# Patient Record
Sex: FEMALE | Race: WHITE | NOT HISPANIC OR LATINO | Employment: OTHER | URBAN - METROPOLITAN AREA
[De-identification: names, ages, dates, MRNs, and addresses within clinical notes are randomized per-mention and may not be internally consistent; named-entity substitution may affect disease eponyms.]

---

## 2018-05-05 ENCOUNTER — HOSPITAL ENCOUNTER (EMERGENCY)
Facility: HOSPITAL | Age: 23
Discharge: HOME | End: 2018-05-05
Attending: FAMILY MEDICINE
Payer: COMMERCIAL

## 2018-05-05 VITALS
DIASTOLIC BLOOD PRESSURE: 92 MMHG | HEIGHT: 61 IN | WEIGHT: 170 LBS | SYSTOLIC BLOOD PRESSURE: 123 MMHG | TEMPERATURE: 98 F | HEART RATE: 76 BPM | BODY MASS INDEX: 32.1 KG/M2 | OXYGEN SATURATION: 98 %
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
## 2018-05-05 DIAGNOSIS — Z34.91 NORMAL IUP (INTRAUTERINE PREGNANCY) ON PRENATAL ULTRASOUND, FIRST TRIMESTER: ICD-10-CM

## 2018-05-05 PROCEDURE — 76801 OB US < 14 WKS SINGLE FETUS: CPT | Mod: 26,,, | Performed by: RADIOLOGY

## 2018-05-05 PROCEDURE — 99283 EMERGENCY DEPT VISIT LOW MDM: CPT

## 2018-05-05 PROCEDURE — 76801 OB US < 14 WKS SINGLE FETUS: CPT | Mod: TC

## 2018-05-05 PROCEDURE — 76817 TRANSVAGINAL US OBSTETRIC: CPT | Mod: 26,,, | Performed by: RADIOLOGY

## 2018-05-06 NOTE — DISCHARGE INSTRUCTIONS
Follow up with your OBGYN as needed, return to the ER if you develop any increase in pain, vaginal bleeding or spotting or other decline in condition. Tyelnol as needed for discomfort.
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
## 2020-01-01 ENCOUNTER — HOSPITAL ENCOUNTER (EMERGENCY)
Facility: HOSPITAL | Age: 25
Discharge: HOME | End: 2020-01-01
Attending: EMERGENCY MEDICINE

## 2020-01-01 VITALS
HEIGHT: 61 IN | TEMPERATURE: 99 F | OXYGEN SATURATION: 97 % | RESPIRATION RATE: 25 BRPM | WEIGHT: 175 LBS | HEART RATE: 79 BPM | BODY MASS INDEX: 33.04 KG/M2 | DIASTOLIC BLOOD PRESSURE: 77 MMHG | SYSTOLIC BLOOD PRESSURE: 134 MMHG

## 2020-01-01 DIAGNOSIS — R00.2 PALPITATIONS: ICD-10-CM

## 2020-01-01 DIAGNOSIS — I49.3 PVC (PREMATURE VENTRICULAR CONTRACTION): Primary | ICD-10-CM

## 2020-01-01 LAB
ALBUMIN SERPL BCP-MCNC: 4.1 G/DL (ref 3.5–5.2)
ALP SERPL-CCNC: 69 U/L (ref 55–135)
ALT SERPL W/O P-5'-P-CCNC: 21 U/L (ref 10–44)
ANION GAP SERPL CALC-SCNC: 6 MMOL/L (ref 8–16)
AST SERPL-CCNC: 19 U/L (ref 10–40)
B-HCG UR QL: NEGATIVE
BASOPHILS # BLD AUTO: 0.04 K/UL (ref 0–0.2)
BASOPHILS NFR BLD: 0.4 % (ref 0–1.9)
BILIRUB SERPL-MCNC: 0.4 MG/DL (ref 0.1–1)
BUN SERPL-MCNC: 10 MG/DL (ref 6–20)
CALCIUM SERPL-MCNC: 9.3 MG/DL (ref 8.7–10.5)
CHLORIDE SERPL-SCNC: 107 MMOL/L (ref 95–110)
CO2 SERPL-SCNC: 25 MMOL/L (ref 23–29)
CREAT SERPL-MCNC: 0.6 MG/DL (ref 0.5–1.4)
DIFFERENTIAL METHOD: ABNORMAL
EOSINOPHIL # BLD AUTO: 0.1 K/UL (ref 0–0.5)
EOSINOPHIL NFR BLD: 0.8 % (ref 0–8)
ERYTHROCYTE [DISTWIDTH] IN BLOOD BY AUTOMATED COUNT: 11.6 % (ref 11.5–14.5)
EST. GFR  (AFRICAN AMERICAN): >60 ML/MIN/1.73 M^2
EST. GFR  (NON AFRICAN AMERICAN): >60 ML/MIN/1.73 M^2
GLUCOSE SERPL-MCNC: 104 MG/DL (ref 70–110)
HCT VFR BLD AUTO: 41.7 % (ref 37–48.5)
HGB BLD-MCNC: 14.9 G/DL (ref 12–16)
IMM GRANULOCYTES # BLD AUTO: 0.03 K/UL (ref 0–0.04)
IMM GRANULOCYTES NFR BLD AUTO: 0.3 % (ref 0–0.5)
LYMPHOCYTES # BLD AUTO: 2.6 K/UL (ref 1–4.8)
LYMPHOCYTES NFR BLD: 23.9 % (ref 18–48)
MCH RBC QN AUTO: 30.1 PG (ref 27–31)
MCHC RBC AUTO-ENTMCNC: 35.7 G/DL (ref 32–36)
MCV RBC AUTO: 84 FL (ref 82–98)
MONOCYTES # BLD AUTO: 0.7 K/UL (ref 0.3–1)
MONOCYTES NFR BLD: 6.6 % (ref 4–15)
NEUTROPHILS # BLD AUTO: 7.4 K/UL (ref 1.8–7.7)
NEUTROPHILS NFR BLD: 68 % (ref 38–73)
NRBC BLD-RTO: 0 /100 WBC
PLATELET # BLD AUTO: 403 K/UL (ref 150–350)
PMV BLD AUTO: 9.2 FL (ref 9.2–12.9)
POTASSIUM SERPL-SCNC: 3.8 MMOL/L (ref 3.5–5.1)
PROT SERPL-MCNC: 7.6 G/DL (ref 6–8.4)
RBC # BLD AUTO: 4.95 M/UL (ref 4–5.4)
SODIUM SERPL-SCNC: 138 MMOL/L (ref 136–145)
WBC # BLD AUTO: 10.87 K/UL (ref 3.9–12.7)

## 2020-01-01 PROCEDURE — 36415 COLL VENOUS BLD VENIPUNCTURE: CPT

## 2020-01-01 PROCEDURE — 71046 X-RAY EXAM CHEST 2 VIEWS: CPT | Mod: 26,,, | Performed by: RADIOLOGY

## 2020-01-01 PROCEDURE — 93005 ELECTROCARDIOGRAM TRACING: CPT

## 2020-01-01 PROCEDURE — 85025 COMPLETE CBC W/AUTO DIFF WBC: CPT

## 2020-01-01 PROCEDURE — 71046 X-RAY EXAM CHEST 2 VIEWS: CPT | Mod: TC,FY

## 2020-01-01 PROCEDURE — 80053 COMPREHEN METABOLIC PANEL: CPT

## 2020-01-01 PROCEDURE — 93010 ELECTROCARDIOGRAM REPORT: CPT | Mod: ,,, | Performed by: INTERNAL MEDICINE

## 2020-01-01 PROCEDURE — 93010 EKG 12-LEAD: ICD-10-PCS | Mod: ,,, | Performed by: INTERNAL MEDICINE

## 2020-01-01 PROCEDURE — 81025 URINE PREGNANCY TEST: CPT

## 2020-01-01 PROCEDURE — 99285 EMERGENCY DEPT VISIT HI MDM: CPT | Mod: 25

## 2020-01-01 PROCEDURE — 71046 XR CHEST PA AND LATERAL: ICD-10-PCS | Mod: 26,,, | Performed by: RADIOLOGY

## 2020-01-01 NOTE — ED TRIAGE NOTES
Pt c/o mid-sternal CP with palpitations and dizziness that started yesterday but has gotten worse today.

## 2020-01-03 ENCOUNTER — LAB VISIT (OUTPATIENT)
Dept: LAB | Facility: HOSPITAL | Age: 25
End: 2020-01-03
Attending: FAMILY MEDICINE

## 2020-01-03 ENCOUNTER — OFFICE VISIT (OUTPATIENT)
Dept: FAMILY MEDICINE | Facility: CLINIC | Age: 25
End: 2020-01-03

## 2020-01-03 VITALS
HEART RATE: 86 BPM | OXYGEN SATURATION: 97 % | WEIGHT: 183.63 LBS | RESPIRATION RATE: 16 BRPM | BODY MASS INDEX: 34.67 KG/M2 | HEIGHT: 61 IN | DIASTOLIC BLOOD PRESSURE: 74 MMHG | SYSTOLIC BLOOD PRESSURE: 119 MMHG | TEMPERATURE: 98 F

## 2020-01-03 DIAGNOSIS — Z76.89 ENCOUNTER TO ESTABLISH CARE: Primary | ICD-10-CM

## 2020-01-03 DIAGNOSIS — R00.2 PALPITATIONS: ICD-10-CM

## 2020-01-03 LAB
T4 FREE SERPL-MCNC: 0.69 NG/DL (ref 0.71–1.51)
TSH SERPL DL<=0.005 MIU/L-ACNC: 2.06 UIU/ML (ref 0.34–5.6)

## 2020-01-03 PROCEDURE — 36415 COLL VENOUS BLD VENIPUNCTURE: CPT

## 2020-01-03 PROCEDURE — 84439 ASSAY OF FREE THYROXINE: CPT

## 2020-01-03 PROCEDURE — 84443 ASSAY THYROID STIM HORMONE: CPT

## 2020-01-03 PROCEDURE — 99203 PR OFFICE/OUTPT VISIT, NEW, LEVL III, 30-44 MIN: ICD-10-PCS | Mod: S$GLB,,, | Performed by: FAMILY MEDICINE

## 2020-01-03 PROCEDURE — 99203 OFFICE O/P NEW LOW 30 MIN: CPT | Mod: S$GLB,,, | Performed by: FAMILY MEDICINE

## 2020-01-04 ENCOUNTER — PATIENT MESSAGE (OUTPATIENT)
Dept: FAMILY MEDICINE | Facility: CLINIC | Age: 25
End: 2020-01-04

## 2020-01-06 NOTE — ED PROVIDER NOTES
Encounter Date: 2020       History     Chief Complaint   Patient presents with    Chest Pain    Palpitations     24-year-old female with no significant past medical history presents to the ED for emergent evaluation of midsternal chest pain with palpitations and dizziness starting yesterday but has gotten worse today.  States she works as a  and chest pain started yesterday. Reports increased pain with movement, cough, and palpation. Pain is intermittent, aching, non-radiating. Denies edema, diaphoresis. Denies fever, chills.        Review of patient's allergies indicates:  No Known Allergies  History reviewed. No pertinent past medical history.  Past Surgical History:   Procedure Laterality Date    INDUCED        Family History   Problem Relation Age of Onset    Heart disease Father      Social History     Tobacco Use    Smoking status: Never Smoker    Smokeless tobacco: Never Used   Substance Use Topics    Alcohol use: No    Drug use: No     Review of Systems   Constitutional: Negative for appetite change, chills, diaphoresis, fatigue and fever.   HENT: Negative for congestion, ear pain, rhinorrhea, sinus pressure, sinus pain, sore throat and tinnitus.    Eyes: Negative for photophobia and visual disturbance.   Respiratory: Negative for cough, chest tightness, shortness of breath and wheezing.    Cardiovascular: Positive for chest pain and palpitations. Negative for leg swelling.   Gastrointestinal: Negative for abdominal pain, constipation, diarrhea, nausea and vomiting.   Endocrine: Negative for cold intolerance, heat intolerance, polydipsia, polyphagia and polyuria.   Genitourinary: Negative for decreased urine volume, difficulty urinating, dysuria, flank pain, frequency, hematuria and urgency.   Musculoskeletal: Negative for arthralgias, back pain, gait problem, joint swelling, myalgias, neck pain and neck stiffness.   Skin: Negative for color change, pallor, rash and wound.    Allergic/Immunologic: Negative for immunocompromised state.   Neurological: Negative for dizziness, syncope, weakness, light-headedness, numbness and headaches.   Hematological: Negative for adenopathy. Does not bruise/bleed easily.   Psychiatric/Behavioral: Negative for decreased concentration, dysphoric mood and sleep disturbance. The patient is nervous/anxious.    All other systems reviewed and are negative.      Physical Exam     Initial Vitals [01/01/20 1053]   BP Pulse Resp Temp SpO2   (!) 140/84 106 (!) 22 98.7 °F (37.1 °C) 100 %      MAP       --         Physical Exam    Nursing note and vitals reviewed.  Constitutional: She appears well-developed and well-nourished. She is not diaphoretic. No distress.   HENT:   Head: Normocephalic and atraumatic.   Right Ear: External ear normal.   Left Ear: External ear normal.   Nose: Nose normal.   Mouth/Throat: Oropharynx is clear and moist.   Eyes: Conjunctivae are normal. Pupils are equal, round, and reactive to light. No scleral icterus.   Neck: Normal range of motion. Neck supple.   Cardiovascular: Normal rate, regular rhythm, normal heart sounds and intact distal pulses.   Pulmonary/Chest: Breath sounds normal. No respiratory distress. She has no wheezes. She has no rhonchi. She exhibits no tenderness.   Abdominal: Soft. Bowel sounds are normal. She exhibits no distension. There is no tenderness. There is no rebound and no guarding.   Musculoskeletal: Normal range of motion. She exhibits no edema or tenderness.   Lymphadenopathy:     She has no cervical adenopathy.   Neurological: She is alert and oriented to person, place, and time. GCS score is 15. GCS eye subscore is 4. GCS verbal subscore is 5. GCS motor subscore is 6.   Skin: Skin is warm and dry. Capillary refill takes less than 2 seconds. No rash noted. No erythema. No pallor.   Psychiatric: She has a normal mood and affect. Her behavior is normal. Judgment and thought content normal.         ED Course    Procedures  Labs Reviewed   CBC W/ AUTO DIFFERENTIAL - Abnormal; Notable for the following components:       Result Value    Platelets 403 (*)     All other components within normal limits   COMPREHENSIVE METABOLIC PANEL - Abnormal; Notable for the following components:    Anion Gap 6 (*)     All other components within normal limits   PREGNANCY TEST, URINE RAPID     EKG Readings: (Independently Interpreted)   Initial Reading: No STEMI. Rhythm: Normal Sinus Rhythm. Heart Rate: 99. Ectopy: No Ectopy. Conduction: Normal. ST Segments: Normal ST Segments. T Waves: Normal. Axis: Normal. Clinical Impression: Normal Sinus Rhythm     ECG Results          EKG 12-lead (Final result)  Result time 01/02/20 20:20:28    Final result by Interface, Lab In [facility] (01/02/20 20:20:28)                 Narrative:    Test Reason : R00.2,    Vent. Rate : 099 BPM     Atrial Rate : 099 BPM     P-R Int : 132 ms          QRS Dur : 070 ms      QT Int : 334 ms       P-R-T Axes : 027 049 012 degrees     QTc Int : 428 ms    Normal sinus rhythm  Nonspecific T wave abnormality  Abnormal ECG  No previous ECGs available  Confirmed by Shailesh Ledbetter MD ([id number]) on 1/2/2020 8:20:20 PM    Referred By: [email or web address]   SELF           Confirmed By:Shailesh Ledbetter MD                            Imaging Results          X-Ray Chest PA And Lateral (Final result)  Result time 01/01/20 14:49:24    Final result by Ronaldo Yuen MD (01/01/20 14:49:24)                 Narrative:    EXAMINATION:  XR CHEST PA AND LATERAL    CLINICAL HISTORY:  Palpitations    TECHNIQUE:  PA and lateral views of the chest were performed.    COMPARISON:  None    FINDINGS:  Lungs are clear.Normal cardiomediastinal silhouette.Normal pulmonary vascular distribution.No pleural effusion or pneumothorax.No acute osseous abnormality.      Electronically signed by: Ronaldo Yuen  Date:    01/01/2020  Time:    14:49                            X-Rays:   Independently Interpreted Readings:   Chest X-Ray:  Normal heart size.  No infiltrates.  No acute abnormalities.     Medical Decision Making:   Differential Diagnosis:   Palpitations, arrhythmia, electrolyte imbalance, anemia, anxiety  ED Management:  Unremarkable workup, EKG  Cardiac monitoring with infrequent single PVCs appreciated by the patient and described as a sensation she has been experiencing  Discussed all findings with the patient, advised of return precautions, and instructed to follow up with primary care  Recommend Holter monitor  Discharged in medically stable condition                                   Clinical Impression:       ICD-10-CM ICD-9-CM   1. PVC (premature ventricular contraction) I49.3 427.69   2. Palpitations R00.2 785.1         Disposition:   Disposition: Discharged  Condition: Stable                     Morelia Leavitt MD  01/05/20 5235

## 2020-01-09 NOTE — PROGRESS NOTES
"[facility] - Clinic Note    Subjective      Ms. Narayan is a 24 y.o. female who presents to clinic for Follow-up (WENT TO ER ON 20)    Patient with no significant past medical history went to the ER on 2024 mid sternal chest pain with palpitations and dizziness.  She states that for the last couple of days she has had right chest pain with palpitations.  She states that it feels like her heart is skipping a beat.  Reports some associated lightheadedness and shortness of breath.  In the ER cardiac workup was negative.  EKG showed infrequent single PVCs.  She states that this happens many times throughout the day.  She is currently not having any chest pain or shortness of breath.    PM Keisha has no past medical history on file.   PSXH Keisha has a past surgical history that includes Induced .    Keisha's family history includes Heart disease in her father.   SH Keisha reports that she has never smoked. She has never used smokeless tobacco. She reports that she does not drink alcohol or use drugs.   ALG Keisha has No Known Allergies.   MED Keisha currently has no medications in their medication list.     Review of Systems   Constitutional: Negative for chills and fever.   HENT: Negative for congestion and rhinorrhea.    Eyes: Negative for visual disturbance.   Respiratory: Negative for cough and shortness of breath.    Cardiovascular: Positive for palpitations. Negative for chest pain.   Gastrointestinal: Negative for abdominal pain, constipation, diarrhea, nausea and vomiting.   Genitourinary: Negative for dysuria.   Musculoskeletal: Negative for myalgias.   Skin: Negative for rash.   Neurological: Negative for weakness and headaches.     Objective     /74 (BP Location: Left arm, Patient Position: Sitting, BP Method: X-Large (Automatic))   Pulse 86   Temp 98.3 °F (36.8 °C) (Oral)   Resp 16   Ht 5' 1" (1.549 m)   Wt 83.3 kg (183 lb 9.6 oz)   LMP 2019 " (Exact Date)   SpO2 97%   BMI 34.69 kg/m²     Physical Exam   Constitutional: She appears well-developed and well-nourished. No distress.   HENT:   Right Ear: External ear normal.   Left Ear: External ear normal.   Eyes: Right eye exhibits no discharge. Left eye exhibits no discharge.   Cardiovascular: Normal rate, regular rhythm, normal heart sounds and intact distal pulses.   No murmur heard.  Pulmonary/Chest: Effort normal and breath sounds normal. She has no wheezes. She has no rales.   Neurological: She is alert.   Skin: Skin is warm and dry.   Psychiatric: She has a normal mood and affect.   Nursing note and vitals reviewed.     Assessment/Plan     1. Encounter to establish care     2. Palpitations  TSH    T4, free    Holter monitor - 48 hour     Given palpitations could be related to PVCs.  Will obtain TSH and free T4 as well as Holter monitor for further evaluation.  Can consider beta-blocker if symptoms persist.    Follow up in about 2 weeks (around 1/17/2020) for follow up.    Future Appointments   Date Time Provider Department Center   1/17/2020  2:00 PM Tate Acosta MD [facility]         Tate Acosta MD  Family Medicine  [facility]

## 2020-05-20 ENCOUNTER — PATIENT MESSAGE (OUTPATIENT)
Dept: ADMINISTRATIVE | Facility: HOSPITAL | Age: 25
End: 2020-05-20

## 2020-08-23 ENCOUNTER — HOSPITAL ENCOUNTER (EMERGENCY)
Facility: HOSPITAL | Age: 25
Discharge: HOME | End: 2020-08-23
Attending: EMERGENCY MEDICINE

## 2020-08-23 VITALS
DIASTOLIC BLOOD PRESSURE: 69 MMHG | BODY MASS INDEX: 32.1 KG/M2 | TEMPERATURE: 98 F | HEART RATE: 96 BPM | HEIGHT: 61 IN | OXYGEN SATURATION: 98 % | SYSTOLIC BLOOD PRESSURE: 109 MMHG | WEIGHT: 170 LBS | RESPIRATION RATE: 16 BRPM

## 2020-08-23 DIAGNOSIS — N39.0 URINARY TRACT INFECTION WITHOUT HEMATURIA, SITE UNSPECIFIED: ICD-10-CM

## 2020-08-23 DIAGNOSIS — R55 SYNCOPE, UNSPECIFIED SYNCOPE TYPE: Primary | ICD-10-CM

## 2020-08-23 DIAGNOSIS — R55 SYNCOPE: ICD-10-CM

## 2020-08-23 LAB
ALBUMIN SERPL BCP-MCNC: 4.1 G/DL (ref 3.5–5.2)
ALP SERPL-CCNC: 75 U/L (ref 55–135)
ALT SERPL W/O P-5'-P-CCNC: 20 U/L (ref 10–44)
ANION GAP SERPL CALC-SCNC: 10 MMOL/L (ref 8–16)
AST SERPL-CCNC: 18 U/L (ref 10–40)
B-HCG UR QL: NEGATIVE
BACTERIA #/AREA URNS HPF: ABNORMAL /HPF
BASOPHILS # BLD AUTO: 0.02 K/UL (ref 0–0.2)
BASOPHILS NFR BLD: 0.2 % (ref 0–1.9)
BILIRUB SERPL-MCNC: 0.9 MG/DL (ref 0.1–1)
BILIRUB UR QL STRIP: NEGATIVE
BUN SERPL-MCNC: 11 MG/DL (ref 6–20)
CALCIUM SERPL-MCNC: 8.7 MG/DL (ref 8.7–10.5)
CHLORIDE SERPL-SCNC: 104 MMOL/L (ref 95–110)
CLARITY UR: ABNORMAL
CO2 SERPL-SCNC: 22 MMOL/L (ref 23–29)
COLOR UR: YELLOW
CREAT SERPL-MCNC: 0.7 MG/DL (ref 0.5–1.4)
DIFFERENTIAL METHOD: ABNORMAL
EOSINOPHIL # BLD AUTO: 0.1 K/UL (ref 0–0.5)
EOSINOPHIL NFR BLD: 0.5 % (ref 0–8)
ERYTHROCYTE [DISTWIDTH] IN BLOOD BY AUTOMATED COUNT: 11.8 % (ref 11.5–14.5)
EST. GFR  (AFRICAN AMERICAN): >60 ML/MIN/1.73 M^2
EST. GFR  (NON AFRICAN AMERICAN): >60 ML/MIN/1.73 M^2
GLUCOSE SERPL-MCNC: 106 MG/DL (ref 70–110)
GLUCOSE UR QL STRIP: NEGATIVE
HCT VFR BLD AUTO: 38.4 % (ref 37–48.5)
HETEROPH AB SERPL QL IA: NEGATIVE
HGB BLD-MCNC: 14 G/DL (ref 12–16)
HGB UR QL STRIP: NEGATIVE
IMM GRANULOCYTES # BLD AUTO: 0.04 K/UL (ref 0–0.04)
IMM GRANULOCYTES NFR BLD AUTO: 0.3 % (ref 0–0.5)
KETONES UR QL STRIP: NEGATIVE
LEUKOCYTE ESTERASE UR QL STRIP: ABNORMAL
LYMPHOCYTES # BLD AUTO: 2.5 K/UL (ref 1–4.8)
LYMPHOCYTES NFR BLD: 20.2 % (ref 18–48)
MCH RBC QN AUTO: 30.2 PG (ref 27–31)
MCHC RBC AUTO-ENTMCNC: 36.5 G/DL (ref 32–36)
MCV RBC AUTO: 83 FL (ref 82–98)
MICROSCOPIC COMMENT: ABNORMAL
MONOCYTES # BLD AUTO: 0.7 K/UL (ref 0.3–1)
MONOCYTES NFR BLD: 5.4 % (ref 4–15)
NEUTROPHILS # BLD AUTO: 8.9 K/UL (ref 1.8–7.7)
NEUTROPHILS NFR BLD: 73.4 % (ref 38–73)
NITRITE UR QL STRIP: NEGATIVE
NRBC BLD-RTO: 0 /100 WBC
PH UR STRIP: 5 [PH] (ref 5–8)
PLATELET # BLD AUTO: 367 K/UL (ref 150–350)
PMV BLD AUTO: 9 FL (ref 9.2–12.9)
POCT GLUCOSE: 90 MG/DL (ref 70–110)
POTASSIUM SERPL-SCNC: 3.4 MMOL/L (ref 3.5–5.1)
PROT SERPL-MCNC: 7.5 G/DL (ref 6–8.4)
PROT UR QL STRIP: ABNORMAL
RBC # BLD AUTO: 4.64 M/UL (ref 4–5.4)
SODIUM SERPL-SCNC: 136 MMOL/L (ref 136–145)
SP GR UR STRIP: >=1.03 (ref 1–1.03)
SQUAMOUS #/AREA URNS HPF: ABNORMAL /HPF
URN SPEC COLLECT METH UR: ABNORMAL
UROBILINOGEN UR STRIP-ACNC: NEGATIVE EU/DL
WBC # BLD AUTO: 12.17 K/UL (ref 3.9–12.7)
WBC #/AREA URNS HPF: 14 /HPF (ref 0–5)

## 2020-08-23 PROCEDURE — 36415 COLL VENOUS BLD VENIPUNCTURE: CPT

## 2020-08-23 PROCEDURE — 82962 GLUCOSE BLOOD TEST: CPT

## 2020-08-23 PROCEDURE — 81025 URINE PREGNANCY TEST: CPT

## 2020-08-23 PROCEDURE — 25000003 PHARM REV CODE 250: Performed by: EMERGENCY MEDICINE

## 2020-08-23 PROCEDURE — 85025 COMPLETE CBC W/AUTO DIFF WBC: CPT

## 2020-08-23 PROCEDURE — 86308 HETEROPHILE ANTIBODY SCREEN: CPT

## 2020-08-23 PROCEDURE — 96361 HYDRATE IV INFUSION ADD-ON: CPT

## 2020-08-23 PROCEDURE — 81000 URINALYSIS NONAUTO W/SCOPE: CPT

## 2020-08-23 PROCEDURE — 99284 EMERGENCY DEPT VISIT MOD MDM: CPT | Mod: 25

## 2020-08-23 PROCEDURE — 80053 COMPREHEN METABOLIC PANEL: CPT

## 2020-08-23 PROCEDURE — 87086 URINE CULTURE/COLONY COUNT: CPT

## 2020-08-23 PROCEDURE — 93005 ELECTROCARDIOGRAM TRACING: CPT

## 2020-08-23 PROCEDURE — 96365 THER/PROPH/DIAG IV INF INIT: CPT

## 2020-08-23 PROCEDURE — 63600175 PHARM REV CODE 636 W HCPCS: Performed by: EMERGENCY MEDICINE

## 2020-08-23 RX ORDER — CEPHALEXIN 500 MG/1
500 CAPSULE ORAL 4 TIMES DAILY
Qty: 20 CAPSULE | Refills: 0 | Status: SHIPPED | OUTPATIENT
Start: 2020-08-23 | End: 2020-08-28

## 2020-08-23 RX ADMIN — CEFTRIAXONE 1 G: 1 INJECTION, SOLUTION INTRAVENOUS at 08:08

## 2020-08-23 RX ADMIN — SODIUM CHLORIDE 1000 ML: 0.9 INJECTION, SOLUTION INTRAVENOUS at 07:08

## 2020-08-24 NOTE — ED PROVIDER NOTES
Encounter Date: 2020       History     Chief Complaint   Patient presents with    Loss of Consciousness     25-year-old female here for evaluation and treatment of syncopal episode at home.  She stated that yesterday she felt fine and normal, but today she felt somewhat run down, and later in the day began feeling as if she might be coming down with something.  Approximately 30 min prior to arrival, patient was sitting on the couch when she slumped over.  Her  was there with her.  He states that she woke quickly, but she was unresponsive for few moments.  Patient denies any fever or chills.  No headache or blurred vision.  No chest pain or palpitations.  No cough, no shortness of breath, no nausea, no vomiting, no loose stools.  No dysuria.  She does admit to having some soreness in and around both ears a few days ago, and now has very mild sore throat, and feels as if some of the glands in her neck or swollen.  Last menstrual period was 41 days ago.  She states she has irregular periods.  Also states she has been pregnant twice in the past, but had 2 miscarriages.        Review of patient's allergies indicates:  No Known Allergies  History reviewed. No pertinent past medical history.  Past Surgical History:   Procedure Laterality Date    INDUCED        Family History   Problem Relation Age of Onset    Heart disease Father      Social History     Tobacco Use    Smoking status: Never Smoker    Smokeless tobacco: Never Used   Substance Use Topics    Alcohol use: No    Drug use: No     Review of Systems   Constitutional: Positive for fatigue. Negative for chills and fever.   HENT: Positive for ear pain and sore throat. Negative for congestion and rhinorrhea.    Eyes: Negative for photophobia and visual disturbance.   Respiratory: Negative for cough, chest tightness, shortness of breath and stridor.    Cardiovascular: Negative for chest pain and palpitations.   Gastrointestinal: Negative for  abdominal pain, constipation, diarrhea, nausea, rectal pain and vomiting.   Endocrine: Negative for polydipsia and polyuria.   Genitourinary: Negative for decreased urine volume, dysuria, enuresis, genital sores, hematuria, vaginal bleeding and vaginal pain.   Musculoskeletal: Negative for arthralgias, myalgias, neck pain and neck stiffness.   Skin: Negative for pallor and wound.   Neurological: Positive for syncope and weakness. Negative for dizziness, facial asymmetry and light-headedness.   Psychiatric/Behavioral: Negative for confusion, decreased concentration and dysphoric mood. The patient is not nervous/anxious.        Physical Exam     Initial Vitals [08/23/20 1824]   BP Pulse Resp Temp SpO2   119/63 (!) 118 20 98.3 °F (36.8 °C) 98 %      MAP       --         Physical Exam    Nursing note and vitals reviewed.  Constitutional: She appears well-developed and well-nourished. No distress.   HENT:   Head: Normocephalic and atraumatic.   Right Ear: External ear normal.   Left Ear: External ear normal.   Nose: Nose normal.   Mouth/Throat: Oropharynx is clear and moist. No oropharyngeal exudate.   Eyes: Conjunctivae and EOM are normal. Pupils are equal, round, and reactive to light. Right eye exhibits no discharge. Left eye exhibits no discharge. No scleral icterus.   Neck: Normal range of motion. Neck supple. No thyromegaly present. No tracheal deviation present. No JVD present.   Patient complains of some swollen lymph nodes but I do not appreciate any by palpation and there does not appear to be any tenderness.  Her neck is supple and there is no meningismus.   Cardiovascular: Normal rate, regular rhythm, normal heart sounds and intact distal pulses.   No murmur heard.  Pulmonary/Chest: Breath sounds normal. No stridor. No respiratory distress. She has no wheezes.   Abdominal: Soft. Bowel sounds are normal. She exhibits no distension. There is no abdominal tenderness.   Genitourinary:    Vagina and uterus normal.      Musculoskeletal: Normal range of motion. No tenderness or edema.   Lymphadenopathy:     She has no cervical adenopathy.   Neurological: She is alert and oriented to person, place, and time. She has normal strength and normal reflexes. No cranial nerve deficit or sensory deficit. GCS score is 15. GCS eye subscore is 4. GCS verbal subscore is 5. GCS motor subscore is 6.   Skin: Skin is warm and dry. Capillary refill takes less than 2 seconds. No rash noted. No erythema.   Psychiatric: She has a normal mood and affect. Her behavior is normal.         ED Course   Procedures  Labs Reviewed   URINALYSIS, REFLEX TO URINE CULTURE - Abnormal; Notable for the following components:       Result Value    Appearance, UA Hazy (*)     Specific Gravity, UA >=1.030 (*)     Protein, UA Trace (*)     Leukocytes, UA 1+ (*)     All other components within normal limits    Narrative:     Specimen Source->Urine   CBC W/ AUTO DIFFERENTIAL - Abnormal; Notable for the following components:    Mean Corpuscular Hemoglobin Conc 36.5 (*)     Platelets 367 (*)     MPV 9.0 (*)     Gran # (ANC) 8.9 (*)     Gran% 73.4 (*)     All other components within normal limits   COMPREHENSIVE METABOLIC PANEL - Abnormal; Notable for the following components:    Potassium 3.4 (*)     CO2 22 (*)     All other components within normal limits   URINALYSIS MICROSCOPIC - Abnormal; Notable for the following components:    WBC, UA 14 (*)     Bacteria Few (*)     All other components within normal limits    Narrative:     Specimen Source->Urine   CULTURE, URINE   PREGNANCY TEST, URINE RAPID    Narrative:     Specimen Source->Urine   HETEROPHILE AB SCREEN   HETEROPHILE AB SCREEN          Imaging Results    None          Medical Decision Making:   Differential Diagnosis:   Viral illness, mononucleosis, otitis media, urinary tract infection, pregnancy, etc.  ED Management:  Patient's labs show a normal white blood cell count, no anemia, no dehydration, normal BUN and  creatinine, normal electrolytes.  Urine does show some evidence of white blood cells and bacteria.  While here in the emergency department, the patient was given 1 g Rocephin, and 1 L normal saline IV.  She is feeling much better.  Orthostatic vital signs were performed.  These were normal.  She does appear to have a mild urinary tract infection which may be the cause of her symptoms, but the early stages of a viral illness are not ruled out.  Patient states she has no primary care provider, so an ambulatory referral to family medicine will be entered.  Patient was told she needs to return here if worse in any way and she agrees to do so.  Will be discharged with a prescription for Keflex and she will take Tylenol and Motrin at home as well.                                 Clinical Impression:       ICD-10-CM ICD-9-CM   1. Syncope, unspecified syncope type  R55 780.2   2. Syncope  R55 780.2   3. Urinary tract infection without hematuria, site unspecified  N39.0 599.0             ED Disposition Condition    Discharge Stable        ED Prescriptions     None        Follow-up Information    None                                    Brad Galindo MD  08/23/20 2100

## 2020-08-24 NOTE — DISCHARGE INSTRUCTIONS
Drink plenty of fluids and get plenty of rest.  Take Keflex as prescribed, and take Tylenol and Motrin for discomfort and fever.  Follow-up with family practice.  You should receive a phone call regarding an appointment within the next several days.  Return here as needed or if worse in any way.

## 2020-08-25 LAB — BACTERIA UR CULT: NO GROWTH

## 2020-10-05 ENCOUNTER — PATIENT MESSAGE (OUTPATIENT)
Dept: ADMINISTRATIVE | Facility: HOSPITAL | Age: 25
End: 2020-10-05

## 2021-01-04 ENCOUNTER — PATIENT MESSAGE (OUTPATIENT)
Dept: ADMINISTRATIVE | Facility: HOSPITAL | Age: 26
End: 2021-01-04

## 2021-04-07 ENCOUNTER — PATIENT MESSAGE (OUTPATIENT)
Dept: ADMINISTRATIVE | Facility: HOSPITAL | Age: 26
End: 2021-04-07

## 2021-04-08 DIAGNOSIS — Z11.59 NEED FOR HEPATITIS C SCREENING TEST: ICD-10-CM

## 2021-12-13 ENCOUNTER — LAB VISIT (OUTPATIENT)
Dept: LAB | Facility: HOSPITAL | Age: 26
End: 2021-12-13
Attending: NURSE PRACTITIONER
Payer: COMMERCIAL

## 2021-12-13 DIAGNOSIS — Z3A.27 27 WEEKS GESTATION OF PREGNANCY: ICD-10-CM

## 2021-12-13 LAB
ABO + RH BLD: NORMAL
BASOPHILS # BLD AUTO: 0.02 K/UL (ref 0–0.2)
BASOPHILS NFR BLD: 0.2 % (ref 0–1.9)
BLD GP AB SCN CELLS X3 SERPL QL: NORMAL
DIFFERENTIAL METHOD: ABNORMAL
EOSINOPHIL # BLD AUTO: 0 K/UL (ref 0–0.5)
EOSINOPHIL NFR BLD: 0.2 % (ref 0–8)
ERYTHROCYTE [DISTWIDTH] IN BLOOD BY AUTOMATED COUNT: 12.6 % (ref 11.5–14.5)
HCT VFR BLD AUTO: 33.7 % (ref 37–48.5)
HGB BLD-MCNC: 12 G/DL (ref 12–16)
IMM GRANULOCYTES # BLD AUTO: 0.03 K/UL (ref 0–0.04)
IMM GRANULOCYTES NFR BLD AUTO: 0.3 % (ref 0–0.5)
LYMPHOCYTES # BLD AUTO: 1.4 K/UL (ref 1–4.8)
LYMPHOCYTES NFR BLD: 14.3 % (ref 18–48)
MCH RBC QN AUTO: 30.5 PG (ref 27–31)
MCHC RBC AUTO-ENTMCNC: 35.6 G/DL (ref 32–36)
MCV RBC AUTO: 86 FL (ref 82–98)
MONOCYTES # BLD AUTO: 0.4 K/UL (ref 0.3–1)
MONOCYTES NFR BLD: 3.9 % (ref 4–15)
NEUTROPHILS # BLD AUTO: 8 K/UL (ref 1.8–7.7)
NEUTROPHILS NFR BLD: 81.1 % (ref 38–73)
NRBC BLD-RTO: 0 /100 WBC
PLATELET # BLD AUTO: 327 K/UL (ref 150–450)
PMV BLD AUTO: 9.4 FL (ref 9.2–12.9)
RBC # BLD AUTO: 3.94 M/UL (ref 4–5.4)
WBC # BLD AUTO: 9.81 K/UL (ref 3.9–12.7)

## 2021-12-13 PROCEDURE — 36415 COLL VENOUS BLD VENIPUNCTURE: CPT | Performed by: NURSE PRACTITIONER

## 2021-12-13 PROCEDURE — 85025 COMPLETE CBC W/AUTO DIFF WBC: CPT | Performed by: NURSE PRACTITIONER

## 2021-12-13 PROCEDURE — 86901 BLOOD TYPING SEROLOGIC RH(D): CPT | Performed by: NURSE PRACTITIONER

## 2021-12-13 PROCEDURE — 87389 HIV-1 AG W/HIV-1&-2 AB AG IA: CPT | Performed by: NURSE PRACTITIONER

## 2021-12-13 PROCEDURE — 86592 SYPHILIS TEST NON-TREP QUAL: CPT | Performed by: NURSE PRACTITIONER

## 2021-12-14 LAB
HIV 1+2 AB+HIV1 P24 AG SERPL QL IA: NEGATIVE
RPR SER QL: NORMAL

## 2022-01-11 ENCOUNTER — PATIENT MESSAGE (OUTPATIENT)
Dept: ADMINISTRATIVE | Facility: HOSPITAL | Age: 27
End: 2022-01-11
Payer: COMMERCIAL

## 2022-02-16 ENCOUNTER — HOSPITAL ENCOUNTER (OUTPATIENT)
Facility: HOSPITAL | Age: 27
Discharge: HOME | End: 2022-02-16
Attending: OBSTETRICS & GYNECOLOGY | Admitting: OBSTETRICS & GYNECOLOGY
Payer: COMMERCIAL

## 2022-02-16 VITALS
WEIGHT: 188 LBS | RESPIRATION RATE: 16 BRPM | SYSTOLIC BLOOD PRESSURE: 127 MMHG | TEMPERATURE: 98 F | BODY MASS INDEX: 35.5 KG/M2 | DIASTOLIC BLOOD PRESSURE: 74 MMHG | HEART RATE: 80 BPM | OXYGEN SATURATION: 92 % | HEIGHT: 61 IN

## 2022-02-16 DIAGNOSIS — Z3A.37 37 WEEKS GESTATION OF PREGNANCY: ICD-10-CM

## 2022-02-16 LAB
BACTERIA #/AREA URNS HPF: ABNORMAL /HPF
BILIRUB UR QL STRIP: NEGATIVE
CLARITY UR: CLEAR
COLOR UR: YELLOW
GLUCOSE UR QL STRIP: NEGATIVE
HGB UR QL STRIP: NEGATIVE
HYALINE CASTS #/AREA URNS LPF: 0 /LPF
KETONES UR QL STRIP: ABNORMAL
LEUKOCYTE ESTERASE UR QL STRIP: ABNORMAL
MICROSCOPIC COMMENT: ABNORMAL
NITRITE UR QL STRIP: NEGATIVE
PH UR STRIP: 8 [PH] (ref 5–8)
PROT UR QL STRIP: ABNORMAL
RBC #/AREA URNS HPF: 10 /HPF (ref 0–4)
SP GR UR STRIP: 1.02 (ref 1–1.03)
SQUAMOUS #/AREA URNS HPF: 20 /HPF
URN SPEC COLLECT METH UR: ABNORMAL
UROBILINOGEN UR STRIP-ACNC: 1 EU/DL
WBC #/AREA URNS HPF: 12 /HPF (ref 0–5)

## 2022-02-16 PROCEDURE — 99211 OFF/OP EST MAY X REQ PHY/QHP: CPT | Mod: 25

## 2022-02-16 PROCEDURE — 59025 FETAL NON-STRESS TEST: CPT

## 2022-02-16 PROCEDURE — 81000 URINALYSIS NONAUTO W/SCOPE: CPT | Performed by: NURSE PRACTITIONER

## 2022-02-16 PROCEDURE — 87086 URINE CULTURE/COLONY COUNT: CPT | Performed by: NURSE PRACTITIONER

## 2022-02-16 RX ORDER — BUTORPHANOL TARTRATE 2 MG/ML
2 INJECTION INTRAMUSCULAR; INTRAVENOUS ONCE
Status: DISCONTINUED | OUTPATIENT
Start: 2022-02-16 | End: 2022-02-16 | Stop reason: HOSPADM

## 2022-02-16 RX ORDER — ACETAMINOPHEN 500 MG
500 TABLET ORAL EVERY 6 HOURS PRN
Status: DISCONTINUED | OUTPATIENT
Start: 2022-02-16 | End: 2022-02-16 | Stop reason: HOSPADM

## 2022-02-16 RX ORDER — ONDANSETRON 4 MG/1
8 TABLET, ORALLY DISINTEGRATING ORAL EVERY 8 HOURS PRN
Status: DISCONTINUED | OUTPATIENT
Start: 2022-02-16 | End: 2022-02-16 | Stop reason: HOSPADM

## 2022-02-16 RX ORDER — PROMETHAZINE HYDROCHLORIDE 25 MG/ML
25 INJECTION, SOLUTION INTRAMUSCULAR; INTRAVENOUS ONCE
Status: DISCONTINUED | OUTPATIENT
Start: 2022-02-16 | End: 2022-02-16 | Stop reason: HOSPADM

## 2022-02-17 LAB — BACTERIA UR CULT: NO GROWTH

## 2022-02-17 NOTE — NURSING
OB NURSE TRIAGE NOTE           Chief Complaint:  No chief complaint on file.      S: 26 y.o.  G5__ P_1_ with IUP @  _37.1_____ present with c/o No chief complaint on file.       O:  VS:  Temp: 98.4  Vitals:    02/16/22 1933 02/16/22 1934 02/16/22 2006 02/16/22 2010   BP:  124/63  127/74   BP Location:       Patient Position:       Pulse: 97 88 87 80   Resp:       Temp:       TempSrc:       SpO2: 96%  (!) 92%    Weight:       Height:         _     FHT'S __140____     CTX'S __4-8____     SVE:   _4,70,-2_____    A: IUP @          DX: Patient on labor allan for four hours without change to cervix.      P:  ORDERS:    Orders Placed This Encounter   Procedures    Urine Culture High Risk     Standing Status:   Standing     Number of Occurrences:   1     Order Specific Question:   Indicated criteria for high risk culture:     Answer:   Pregnant    Urinalysis     Standing Status:   Standing     Number of Occurrences:   1    Urinalysis Microscopic     Standing Status:   Standing     Number of Occurrences:   1    Vital Signs (Specify Frequency)     Standing Status:   Standing     Number of Occurrences:   1    Fetal monitoring     Standing Status:   Standing     Number of Occurrences:   1    Continuous tocometry     Standing Status:   Standing     Number of Occurrences:   1    Discharge Criteria     Discharge to home when the following criteria are met: 1.) Minimal nausea/vomiting 2.) Vital signs are stable  3.) Fetal heart rate shows absence of: fetal tachycardia, fetal bradycardia, variable, late or prolonged decelerations, marked or absent variability, or minimal variability without accelerations, or sinusoidal pattern, Antepartum     Standing Status:   Standing     Number of Occurrences:   1    Notify clinical provider     Of patient arrival in triage after evaluation.     Standing Status:   Standing     Number of Occurrences:   1    Notify clinical provider     Standing Status:   Standing     Number of  Occurrences:   1     Order Specific Question:   Temperature greater than or equal to     Answer:   100.4     Order Specific Question:   Systolic Blood Pressure SBP greater than or equal to     Answer:   160     Order Specific Question:   Systolic Blood Pressure SBP less than or equal to     Answer:   90     Order Specific Question:   Diastolic Blood Pressure DBP greater than or equal to     Answer:   110     Order Specific Question:   Diastolic Blood Pressure DBP less than or equal to     Answer:   60     Order Specific Question:   Pulse greater than or equal to     Answer:   120     Order Specific Question:   Pulse less than or equal to     Answer:   50     Order Specific Question:   Respirations Rate RR greater than or equal to     Answer:   30     Order Specific Question:   Respirations Rate RR less than or equal to     Answer:   10     Order Specific Question:   Urine output less than     Answer:   30     Comments:   mL in 2 hours     Order Specific Question:   SPO2% less than     Answer:   95    Vital signs     After 4 occurrences monitor vitals every hour.     Standing Status:   Standing     Number of Occurrences:   1    Full code     Standing Status:   Standing     Number of Occurrences:   1    Obtain Fetal nonstress test (NST)     Standing Status:   Standing     Number of Occurrences:   1     Order Specific Question:   Diagnosis:     Answer:   Alteration in comfort associated with uterine contractions [[id number]]     Order Specific Question:   Duration:     Answer:   20 minutes    Place in Outpatient     Standing Status:   Standing     Number of Occurrences:   1     Order Specific Question:   Diagnosis     Answer:   37 weeks gestation of pregnancy [[id number]]    DISCHARGE PATIENT           Standing Status:   Standing     Number of Occurrences:   1        F/U:  02/18/2022      RX: none     ETC: Return as needed      PRECAUTIONS:

## 2022-02-17 NOTE — NURSING
Dr Patel called, cervical exam is no change from previous exam, patient given the decision to stay or return home, patient has decided to return home and will follow up on Friday or return as needed.

## 2022-02-17 NOTE — DISCHARGE INSTRUCTIONS
Keep appointments as scheduled.    Take medications as prescribed.    Drink plenty of fluids and stay well hydrated with water, juice and milk.  Decrease or stop the amount of caffeine you drink.  Stop or decrease the amount of dark syrupy drinks.    Stop smoking or decrease the amount you smoke.    Spotting is normal after vaginal checks and intercourse.    Return if: symptoms worsen, rupture of membranes happen, infant not moving, or for any concerns.  Having regular contractions, 4-6 in a hour, that are lasting 50 seconds to 2 minutes long, over 2-4 hours.  Your water breaks, note time and color.  Having bleeding like a regular/heavy period, that is filling up the whole pad or more.    See handouts for more information.

## 2022-05-31 ENCOUNTER — PATIENT MESSAGE (OUTPATIENT)
Dept: ADMINISTRATIVE | Facility: HOSPITAL | Age: 27
End: 2022-05-31
Payer: COMMERCIAL

## 2024-10-03 ENCOUNTER — HOSPITAL ENCOUNTER (OUTPATIENT)
Dept: RADIOLOGY | Facility: HOSPITAL | Age: 29
Discharge: HOME | End: 2024-10-03

## 2024-10-03 DIAGNOSIS — R05.9 COUGH, UNSPECIFIED TYPE: ICD-10-CM

## 2024-10-03 DIAGNOSIS — R06.00 DYSPNEA, UNSPECIFIED TYPE: ICD-10-CM

## 2024-10-03 DIAGNOSIS — R07.1 CHEST PAIN ON BREATHING: ICD-10-CM

## 2024-10-03 PROCEDURE — 25500020 PHARM REV CODE 255

## 2024-10-03 RX ADMIN — IOHEXOL 75 ML: 350 INJECTION, SOLUTION INTRAVENOUS at 03:10

## 2025-02-05 ENCOUNTER — HOSPITAL ENCOUNTER (EMERGENCY)
Facility: HOSPITAL | Age: 30
Discharge: HOME | End: 2025-02-05
Attending: EMERGENCY MEDICINE
Payer: COMMERCIAL

## 2025-02-05 VITALS
DIASTOLIC BLOOD PRESSURE: 63 MMHG | WEIGHT: 186 LBS | SYSTOLIC BLOOD PRESSURE: 107 MMHG | TEMPERATURE: 100 F | HEART RATE: 88 BPM | OXYGEN SATURATION: 99 % | RESPIRATION RATE: 18 BRPM | BODY MASS INDEX: 35.12 KG/M2 | HEIGHT: 61 IN

## 2025-02-05 DIAGNOSIS — N39.0 URINARY TRACT INFECTION WITHOUT HEMATURIA, SITE UNSPECIFIED: ICD-10-CM

## 2025-02-05 DIAGNOSIS — Z13.6 SCREENING FOR CARDIOVASCULAR CONDITION: ICD-10-CM

## 2025-02-05 DIAGNOSIS — R05.9 COUGH: ICD-10-CM

## 2025-02-05 DIAGNOSIS — A41.9 SEPSIS WITHOUT ACUTE ORGAN DYSFUNCTION, DUE TO UNSPECIFIED ORGANISM: ICD-10-CM

## 2025-02-05 DIAGNOSIS — J18.9 PNEUMONIA OF RIGHT UPPER LOBE DUE TO INFECTIOUS ORGANISM: Primary | ICD-10-CM

## 2025-02-05 LAB
ALBUMIN SERPL BCP-MCNC: 3.2 G/DL (ref 3.5–5.2)
ALP SERPL-CCNC: 123 U/L (ref 40–150)
ALT SERPL W/O P-5'-P-CCNC: 18 U/L (ref 10–44)
ANION GAP SERPL CALC-SCNC: 14 MMOL/L (ref 8–16)
AST SERPL-CCNC: 17 U/L (ref 10–40)
BACTERIA #/AREA URNS HPF: ABNORMAL /HPF
BASOPHILS # BLD AUTO: 0.04 K/UL (ref 0–0.2)
BASOPHILS NFR BLD: 0.2 % (ref 0–1.9)
BILIRUB SERPL-MCNC: 0.7 MG/DL (ref 0.1–1)
BILIRUB UR QL STRIP: ABNORMAL
BUN SERPL-MCNC: 7 MG/DL (ref 6–20)
CALCIUM SERPL-MCNC: 9.4 MG/DL (ref 8.7–10.5)
CHLORIDE SERPL-SCNC: 101 MMOL/L (ref 95–110)
CLARITY UR: ABNORMAL
CO2 SERPL-SCNC: 20 MMOL/L (ref 23–29)
COLOR UR: YELLOW
CREAT SERPL-MCNC: 0.8 MG/DL (ref 0.5–1.4)
DIFFERENTIAL METHOD BLD: ABNORMAL
EOSINOPHIL # BLD AUTO: 0.1 K/UL (ref 0–0.5)
EOSINOPHIL NFR BLD: 0.7 % (ref 0–8)
ERYTHROCYTE [DISTWIDTH] IN BLOOD BY AUTOMATED COUNT: 11.4 % (ref 11.5–14.5)
EST. GFR  (NO RACE VARIABLE): >60 ML/MIN/1.73 M^2
GLUCOSE SERPL-MCNC: 149 MG/DL (ref 70–110)
GLUCOSE UR QL STRIP: NEGATIVE
GROUP A STREP, MOLECULAR: NEGATIVE
HCT VFR BLD AUTO: 36.9 % (ref 37–48.5)
HGB BLD-MCNC: 12.8 G/DL (ref 12–16)
HGB UR QL STRIP: NEGATIVE
HYALINE CASTS #/AREA URNS LPF: 0 /LPF
IMM GRANULOCYTES # BLD AUTO: 0.19 K/UL (ref 0–0.04)
IMM GRANULOCYTES NFR BLD AUTO: 1 % (ref 0–0.5)
INFLUENZA A, MOLECULAR: NEGATIVE
INFLUENZA B, MOLECULAR: NEGATIVE
INR PPP: 1.1 (ref 0.8–1.2)
KETONES UR QL STRIP: ABNORMAL
LACTATE SERPL-SCNC: 0.9 MMOL/L (ref 0.5–2.2)
LEUKOCYTE ESTERASE UR QL STRIP: ABNORMAL
LYMPHOCYTES # BLD AUTO: 1 K/UL (ref 1–4.8)
LYMPHOCYTES NFR BLD: 5.3 % (ref 18–48)
MCH RBC QN AUTO: 29.4 PG (ref 27–31)
MCHC RBC AUTO-ENTMCNC: 34.7 G/DL (ref 32–36)
MCV RBC AUTO: 85 FL (ref 82–98)
MICROSCOPIC COMMENT: ABNORMAL
MONOCYTES # BLD AUTO: 1.7 K/UL (ref 0.3–1)
MONOCYTES NFR BLD: 9.5 % (ref 4–15)
NEUTROPHILS # BLD AUTO: 15.2 K/UL (ref 1.8–7.7)
NEUTROPHILS NFR BLD: 83.3 % (ref 38–73)
NITRITE UR QL STRIP: NEGATIVE
NRBC BLD-RTO: 0 /100 WBC
PH UR STRIP: 6 [PH] (ref 5–8)
PLATELET # BLD AUTO: 341 K/UL (ref 150–450)
PMV BLD AUTO: 9.3 FL (ref 9.2–12.9)
POTASSIUM SERPL-SCNC: 3.4 MMOL/L (ref 3.5–5.1)
PROT SERPL-MCNC: 7.9 G/DL (ref 6–8.4)
PROT UR QL STRIP: ABNORMAL
PROTHROMBIN TIME: 12.4 SEC (ref 9–12.5)
RBC # BLD AUTO: 4.35 M/UL (ref 4–5.4)
RBC #/AREA URNS HPF: 1 /HPF (ref 0–4)
SARS-COV-2 RDRP RESP QL NAA+PROBE: NEGATIVE
SODIUM SERPL-SCNC: 135 MMOL/L (ref 136–145)
SP GR UR STRIP: 1.02 (ref 1–1.03)
SPECIMEN SOURCE: NORMAL
SQUAMOUS #/AREA URNS HPF: 20 /HPF
URN SPEC COLLECT METH UR: ABNORMAL
UROBILINOGEN UR STRIP-ACNC: ABNORMAL EU/DL
WBC # BLD AUTO: 18.23 K/UL (ref 3.9–12.7)
WBC #/AREA URNS HPF: 15 /HPF (ref 0–5)

## 2025-02-05 PROCEDURE — 80053 COMPREHEN METABOLIC PANEL: CPT | Performed by: NURSE PRACTITIONER

## 2025-02-05 PROCEDURE — 36415 COLL VENOUS BLD VENIPUNCTURE: CPT | Performed by: NURSE PRACTITIONER

## 2025-02-05 PROCEDURE — 63600175 PHARM REV CODE 636 W HCPCS: Performed by: EMERGENCY MEDICINE

## 2025-02-05 PROCEDURE — 96375 TX/PRO/DX INJ NEW DRUG ADDON: CPT

## 2025-02-05 PROCEDURE — 71046 X-RAY EXAM CHEST 2 VIEWS: CPT | Mod: 26,,, | Performed by: RADIOLOGY

## 2025-02-05 PROCEDURE — 85610 PROTHROMBIN TIME: CPT | Performed by: NURSE PRACTITIONER

## 2025-02-05 PROCEDURE — 87635 SARS-COV-2 COVID-19 AMP PRB: CPT | Performed by: NURSE PRACTITIONER

## 2025-02-05 PROCEDURE — 99285 EMERGENCY DEPT VISIT HI MDM: CPT | Mod: 25

## 2025-02-05 PROCEDURE — 87040 BLOOD CULTURE FOR BACTERIA: CPT | Performed by: NURSE PRACTITIONER

## 2025-02-05 PROCEDURE — 83605 ASSAY OF LACTIC ACID: CPT | Performed by: NURSE PRACTITIONER

## 2025-02-05 PROCEDURE — 94761 N-INVAS EAR/PLS OXIMETRY MLT: CPT

## 2025-02-05 PROCEDURE — 93010 ELECTROCARDIOGRAM REPORT: CPT | Mod: ,,, | Performed by: INTERNAL MEDICINE

## 2025-02-05 PROCEDURE — 87502 INFLUENZA DNA AMP PROBE: CPT | Performed by: NURSE PRACTITIONER

## 2025-02-05 PROCEDURE — 87651 STREP A DNA AMP PROBE: CPT | Performed by: NURSE PRACTITIONER

## 2025-02-05 PROCEDURE — 81000 URINALYSIS NONAUTO W/SCOPE: CPT | Performed by: NURSE PRACTITIONER

## 2025-02-05 PROCEDURE — 85025 COMPLETE CBC W/AUTO DIFF WBC: CPT | Performed by: NURSE PRACTITIONER

## 2025-02-05 PROCEDURE — 25000003 PHARM REV CODE 250: Performed by: NURSE PRACTITIONER

## 2025-02-05 PROCEDURE — 71046 X-RAY EXAM CHEST 2 VIEWS: CPT | Mod: TC

## 2025-02-05 PROCEDURE — 63600175 PHARM REV CODE 636 W HCPCS: Performed by: NURSE PRACTITIONER

## 2025-02-05 PROCEDURE — 87086 URINE CULTURE/COLONY COUNT: CPT | Performed by: NURSE PRACTITIONER

## 2025-02-05 PROCEDURE — 96361 HYDRATE IV INFUSION ADD-ON: CPT

## 2025-02-05 PROCEDURE — 25000003 PHARM REV CODE 250: Performed by: EMERGENCY MEDICINE

## 2025-02-05 PROCEDURE — 87088 URINE BACTERIA CULTURE: CPT | Performed by: NURSE PRACTITIONER

## 2025-02-05 PROCEDURE — 93005 ELECTROCARDIOGRAM TRACING: CPT

## 2025-02-05 PROCEDURE — 96365 THER/PROPH/DIAG IV INF INIT: CPT

## 2025-02-05 RX ORDER — AZITHROMYCIN 500 MG/1
500 TABLET, FILM COATED ORAL DAILY
Qty: 5 TABLET | Refills: 0 | Status: SHIPPED | OUTPATIENT
Start: 2025-02-05 | End: 2025-02-10

## 2025-02-05 RX ORDER — METOCLOPRAMIDE 10 MG/1
10 TABLET ORAL EVERY 6 HOURS
Qty: 30 TABLET | Refills: 0 | Status: SHIPPED | OUTPATIENT
Start: 2025-02-05

## 2025-02-05 RX ORDER — SODIUM CHLORIDE 9 MG/ML
1000 INJECTION, SOLUTION INTRAVENOUS
Status: DISCONTINUED | OUTPATIENT
Start: 2025-02-05 | End: 2025-02-05

## 2025-02-05 RX ORDER — AMOXICILLIN AND CLAVULANATE POTASSIUM 875; 125 MG/1; MG/1
1 TABLET, FILM COATED ORAL 2 TIMES DAILY
Qty: 20 TABLET | Refills: 0 | Status: SHIPPED | OUTPATIENT
Start: 2025-02-05 | End: 2025-02-15

## 2025-02-05 RX ORDER — POTASSIUM CHLORIDE 20 MEQ/1
40 TABLET, EXTENDED RELEASE ORAL
Status: COMPLETED | OUTPATIENT
Start: 2025-02-05 | End: 2025-02-05

## 2025-02-05 RX ORDER — IBUPROFEN 400 MG/1
800 TABLET ORAL
Status: COMPLETED | OUTPATIENT
Start: 2025-02-05 | End: 2025-02-05

## 2025-02-05 RX ORDER — CEFTRIAXONE 1 G/1
1 INJECTION, POWDER, FOR SOLUTION INTRAMUSCULAR; INTRAVENOUS
Status: COMPLETED | OUTPATIENT
Start: 2025-02-05 | End: 2025-02-05

## 2025-02-05 RX ORDER — AZITHROMYCIN 250 MG/1
250 TABLET, FILM COATED ORAL DAILY
Qty: 4 TABLET | Refills: 0 | Status: SHIPPED | OUTPATIENT
Start: 2025-02-05 | End: 2025-02-05

## 2025-02-05 RX ORDER — AZITHROMYCIN 250 MG/1
500 TABLET, FILM COATED ORAL DAILY
Qty: 10 TABLET | Refills: 0 | Status: SHIPPED | OUTPATIENT
Start: 2025-02-05 | End: 2025-02-05 | Stop reason: ALTCHOICE

## 2025-02-05 RX ORDER — ACETAMINOPHEN 500 MG
1000 TABLET ORAL
Status: COMPLETED | OUTPATIENT
Start: 2025-02-05 | End: 2025-02-05

## 2025-02-05 RX ORDER — METOCLOPRAMIDE 5 MG/1
10 TABLET ORAL
Status: COMPLETED | OUTPATIENT
Start: 2025-02-05 | End: 2025-02-05

## 2025-02-05 RX ORDER — ALBUTEROL SULFATE 90 UG/1
2 INHALANT RESPIRATORY (INHALATION) EVERY 4 HOURS PRN
Qty: 18 G | Refills: 4 | Status: SHIPPED | OUTPATIENT
Start: 2025-02-05

## 2025-02-05 RX ORDER — BENZONATATE 200 MG/1
200 CAPSULE ORAL 3 TIMES DAILY PRN
Qty: 60 CAPSULE | Refills: 0 | Status: SHIPPED | OUTPATIENT
Start: 2025-02-05

## 2025-02-05 RX ORDER — PROMETHAZINE HYDROCHLORIDE AND DEXTROMETHORPHAN HYDROBROMIDE 6.25; 15 MG/5ML; MG/5ML
5 SYRUP ORAL EVERY 4 HOURS PRN
Qty: 150 ML | Refills: 0 | Status: SHIPPED | OUTPATIENT
Start: 2025-02-05

## 2025-02-05 RX ORDER — AMOXICILLIN AND CLAVULANATE POTASSIUM 875; 125 MG/1; MG/1
1 TABLET, FILM COATED ORAL 2 TIMES DAILY
Qty: 14 TABLET | Refills: 0 | Status: SHIPPED | OUTPATIENT
Start: 2025-02-05 | End: 2025-02-05

## 2025-02-05 RX ADMIN — CEFTRIAXONE SODIUM 1 G: 1 INJECTION, POWDER, FOR SOLUTION INTRAMUSCULAR; INTRAVENOUS at 03:02

## 2025-02-05 RX ADMIN — METOCLOPRAMIDE 10 MG: 5 TABLET ORAL at 01:02

## 2025-02-05 RX ADMIN — POTASSIUM CHLORIDE 40 MEQ: 1500 TABLET, EXTENDED RELEASE ORAL at 03:02

## 2025-02-05 RX ADMIN — IBUPROFEN 800 MG: 400 TABLET, FILM COATED ORAL at 01:02

## 2025-02-05 RX ADMIN — ACETAMINOPHEN 1000 MG: 500 TABLET ORAL at 01:02

## 2025-02-05 RX ADMIN — AZITHROMYCIN MONOHYDRATE 500 MG: 500 INJECTION, POWDER, LYOPHILIZED, FOR SOLUTION INTRAVENOUS at 03:02

## 2025-02-05 RX ADMIN — SODIUM CHLORIDE 1000 ML: 9 INJECTION, SOLUTION INTRAVENOUS at 02:02

## 2025-02-05 NOTE — ED PROVIDER NOTES
Encounter Date: 2025       History     Chief Complaint   Patient presents with    Vomiting     Pt. C/o vomiting since . Pt. C/o pain to the right ribs since Monday. States the pain worsens with a deep breath or movement. States she has had a fever since Monday. States she has not taken ibuprofen. States she took 500 mg of Tylenol approx. 5 hours ago.     POV to ED alone.  Patient complains of flu-like illness onset four days ago.  Reporting cough and congestion fever, body aches, lung pain, vomiting.  Excedrin at 0700. Reporting history of pneumonia in 2024. No other complaints    The history is provided by the patient. No  was used.     Review of patient's allergies indicates:  No Known Allergies  Past Medical History:   Diagnosis Date    Anemia     Rh negative state in antepartum period      Past Surgical History:   Procedure Laterality Date    DILATION AND CURETTAGE OF UTERUS      INDUCED        Family History   Problem Relation Name Age of Onset    Heart disease Father      Diabetes Maternal Grandmother       Social History     Tobacco Use    Smoking status: Never    Smokeless tobacco: Never   Substance Use Topics    Alcohol use: No    Drug use: No     Review of Systems   Constitutional:  Positive for chills, fatigue and fever.   HENT:  Positive for congestion and rhinorrhea.    Respiratory:  Positive for cough.         Lung pain   Gastrointestinal:  Positive for nausea and vomiting. Negative for abdominal pain and diarrhea.   Musculoskeletal:  Positive for myalgias.   All other systems reviewed and are negative.      Physical Exam     Initial Vitals [25 1305]   BP Pulse Resp Temp SpO2   128/65 (!) 122 20 (!) 103.5 °F (39.7 °C) 97 %      MAP       --         Physical Exam    Nursing note and vitals reviewed.  Constitutional: She appears well-developed and well-nourished. No distress.   HENT:   Head: Normocephalic and atraumatic. Mouth/Throat: Oropharynx is  clear and moist.   Geographic tongue lesion, no posterior pharynx redness or swelling, no exudate   Eyes: Pupils are equal, round, and reactive to light.   Neck:   No rigidity   Normal range of motion.  Cardiovascular:  Regular rhythm and normal heart sounds.           Tachycardia rate consistent with fever   Pulmonary/Chest: Breath sounds normal. No respiratory distress.   Occasional nonproductive coughing, reporting pain with deep breath   Abdominal: Abdomen is soft. There is no abdominal tenderness.   Musculoskeletal:         General: Normal range of motion.      Cervical back: Normal range of motion.     Neurological: She is alert and oriented to person, place, and time. GCS score is 15. GCS eye subscore is 4. GCS verbal subscore is 5. GCS motor subscore is 6.   Skin: Skin is warm and dry. Capillary refill takes less than 2 seconds.   Psychiatric: She has a normal mood and affect. Thought content normal.         ED Course   Procedures  Labs Reviewed   CBC W/ AUTO DIFFERENTIAL - Abnormal       Result Value    WBC 18.23 (*)     RBC 4.35      Hemoglobin 12.8      Hematocrit 36.9 (*)     MCV 85      MCH 29.4      MCHC 34.7      RDW 11.4 (*)     Platelets 341      MPV 9.3      Immature Granulocytes 1.0 (*)     Gran # (ANC) 15.2 (*)     Immature Grans (Abs) 0.19 (*)     Lymph # 1.0      Mono # 1.7 (*)     Eos # 0.1      Baso # 0.04      nRBC 0      Gran % 83.3 (*)     Lymph % 5.3 (*)     Mono % 9.5      Eosinophil % 0.7      Basophil % 0.2      Differential Method Automated     COMPREHENSIVE METABOLIC PANEL - Abnormal    Sodium 135 (*)     Potassium 3.4 (*)     Chloride 101      CO2 20 (*)     Glucose 149 (*)     BUN 7      Creatinine 0.8      Calcium 9.4      Total Protein 7.9      Albumin 3.2 (*)     Total Bilirubin 0.7      Alkaline Phosphatase 123      AST 17      ALT 18      eGFR >60.0      Anion Gap 14     URINALYSIS, REFLEX TO URINE CULTURE - Abnormal    Specimen UA Urine, Unspecified      Color, UA Yellow       Appearance, UA Hazy (*)     pH, UA 6.0      Specific Gravity, UA 1.020      Protein, UA 2+ (*)     Glucose, UA Negative      Ketones, UA 1+ (*)     Bilirubin (UA) 1+ (*)     Occult Blood UA Negative      Nitrite, UA Negative      Urobilinogen, UA 2.0-3.0 (*)     Leukocytes, UA Trace (*)     Narrative:     Preferred Collection Type->Urine, Clean Catch  Specimen Source->Urine   URINALYSIS MICROSCOPIC - Abnormal    RBC, UA 1      WBC, UA 15 (*)     Bacteria Moderate (*)     Squam Epithel, UA 20      Hyaline Casts, UA 0      Microscopic Comment SEE COMMENT      Narrative:     Preferred Collection Type->Urine, Clean Catch  Specimen Source->Urine   INFLUENZA A & B BY MOLECULAR    Influenza A, Molecular Negative      Influenza B, Molecular Negative      Flu A & B Source Nasal Swab     GROUP A STREP, MOLECULAR    Group A Strep, Molecular Negative     CULTURE, BLOOD   CULTURE, BLOOD   CULTURE, URINE   SARS-COV-2 RNA AMPLIFICATION, QUAL    SARS-CoV-2 RNA, Amplification, Qual Negative     LACTIC ACID, PLASMA    Lactate (Lactic Acid) 0.9     PROTIME-INR    Prothrombin Time 12.4      INR 1.1       EKG Readings: (Independently Interpreted)   Initial Reading: No STEMI. Rhythm: Sinus Tachycardia. Heart Rate: 116. Ectopy: No Ectopy. Conduction: Normal. ST Segments: Normal ST Segments. T Waves: Normal. Axis: Normal. Clinical Impression: Sinus Tachycardia Other Impression: No acute STEMI   @ 1351, sinus tachycardia rate 116, , QRS 74.  No acute STEMI.  Reviewed and discussed with Dr. Kelly        Imaging Results              X-Ray Chest PA And Lateral (Final result)  Result time 02/05/25 13:30:38      Final result by Irving Garcia MD (02/05/25 13:30:38)                   Impression:      Right upper lobe pneumonia.      Electronically signed by: Irving Garcia MD  Date:    02/05/2025  Time:    13:30               Narrative:    EXAMINATION:  XR CHEST PA AND LATERAL    CLINICAL HISTORY:  Cough,  unspecified    TECHNIQUE:  PA and lateral views of the chest were performed.    COMPARISON:  10/03/2024    FINDINGS:  The cardiomediastinal silhouette is with normal limits.  There is new, moderate right upper lobe consolidation in keeping with pneumonia.  No pleural effusion.                                    X-Rays:   Independently Interpreted Readings:   Other Readings:  EXAMINATION:  XR CHEST PA AND LATERAL     CLINICAL HISTORY:  Cough, unspecified     TECHNIQUE:  PA and lateral views of the chest were performed.     COMPARISON:  10/03/2024     FINDINGS:  The cardiomediastinal silhouette is with normal limits.  There is new, moderate right upper lobe consolidation in keeping with pneumonia.  No pleural effusion.     Impression:     Right upper lobe pneumonia.         Medications   azithromycin (ZITHROMAX) 500 mg in 0.9% NaCl 250 mL IVPB (admixture device) (500 mg Intravenous New Bag 2/5/25 1535)   metoclopramide HCl tablet 10 mg (10 mg Oral Given 2/5/25 1331)   acetaminophen tablet 1,000 mg (1,000 mg Oral Given 2/5/25 1329)   ibuprofen tablet 800 mg (800 mg Oral Given 2/5/25 1330)   cefTRIAXone injection 1 g (1 g Intravenous Given 2/5/25 1511)   sodium chloride 0.9% bolus 1,000 mL 1,000 mL (0 mLs Intravenous Stopped 2/5/25 1540)   cefTRIAXone injection 1 g (1 g Intravenous Given 2/5/25 1511)   potassium chloride SA CR tablet 40 mEq (40 mEq Oral Given 2/5/25 1514)     Medical Decision Making  Presents for evaluation of flu-like illness, see HPI  Differentials include but not limited to viral illness, bacterial illness, otitis, sinusitis, bronchitis, pneumonia  Review of chest x-ray: right upper lobe pneumonia, lab work ordered, patient advised of possible admission  @ 1432, WBC elevated 18.23. Discussed with Dr Arevalo  Discussed with patient possible admission.  Patient states she prefers discharge, that she has 2 young children at home.  Prefers to manage the illness at home if at all possible.  Lactic  negative. K+ 3.4, supplement given.  Patient has received normal saline fluid bolus, Rocephin 2 g IV, Zithromax 500 IV, Reglan for nausea.  Tylenol and Motrin p.o..  Vital signs are improved at discharge.  Heart rate less than 100, temperature less than 100.  Patient will be discharged home per her request.  With return precautions given. Work note given. Instructed to Rest, increase fluids, lots of water and liquids.  Tylenol and or Motrin as needed.  The dosing schedule that we discussed prior to discharge.  You are being treated today for pneumonia, sepsis without organ dysfunction, and a urinary tract infection.  You have been given IV antibiotics prior to discharge.  You will start the oral antibiotics in the morning.  You have declined admission today, preferring to manage the illness at home.  Blood culture and urine culture results are pending.  If your blood culture results are positive, you will be called to return for reassessment and probable admission.  You have been given 2 different clinics to call for office recheck, and establish herself as a new patient with either of these clinics. Call for appointment. Return as needed for any issues or concerns as discussed.  Patient agrees with plan of care.  Discussed his poor with Dr. Arevalo          Amount and/or Complexity of Data Reviewed  Labs: ordered. Decision-making details documented in ED Course.  Radiology: ordered. Decision-making details documented in ED Course.  ECG/medicine tests: ordered. Decision-making details documented in ED Course.    Risk  OTC drugs.  Prescription drug management.               ED Course as of 02/05/25 1601   Wed Feb 05, 2025   1538    Comprehensive metabolic panel    Final resultCollected 02/05 14:08    Sodium 135  Potassium 3.4  CO2 20  Glucose 149  Albumin 3.2   Urinalysis Microscopic    Final resultCollected 02/05 14:25    WBC, UA 15  Bacteria, UA Moderate    CBC auto differential    Final resultCollected 02/05  14:08    WBC 18.23  Hematocrit 36.9  RDW 11.4  Immature Granulocytes 1.0  Gran # (ANC) 15.2  Immature Grans (Abs) 0.19  Mono # 1.7  Gran % 83.3  Lymph % 5.3   Urinalysis, Reflex to Urine Culture Urine, Clean Catch    Final resultCollected 02/05 14:25    Appearance, UA Hazy  Protein, UA 2+  Ketones, UA 1+  Bilirubin (UA) 1+  UROBILINOGEN UA 2.0-3.0  Leukocyte Esterase, UA Trace         [CP]      ED Course User Index  [CP] Nguyen Reece NP                           Clinical Impression:  Final diagnoses:  [R05.9] Cough  [Z13.6] Screening for cardiovascular condition  [J18.9] Pneumonia of right upper lobe due to infectious organism (Primary)  [A41.9] Sepsis without acute organ dysfunction, due to unspecified organism  [N39.0] Urinary tract infection without hematuria, site unspecified          ED Disposition Condition    Discharge Stable          ED Prescriptions       Medication Sig Dispense Start Date End Date Auth. Provider    amoxicillin-clavulanate 875-125mg (AUGMENTIN) 875-125 mg per tablet  (Status: Discontinued) Take 1 tablet by mouth 2 (two) times daily. for 7 days 14 tablet 2/5/2025 2/5/2025 Benjamin Kelly MD    azithromycin (Z-JIMBO) 250 MG tablet  (Status: Discontinued) Take 1 tablet (250 mg total) by mouth once daily. for 4 days 4 tablet 2/5/2025 2/5/2025 Benjamin Kelly MD    amoxicillin-clavulanate 875-125mg (AUGMENTIN) 875-125 mg per tablet Take 1 tablet by mouth 2 (two) times daily. for 10 days 20 tablet 2/5/2025 2/15/2025 Nguyen Reece NP    azithromycin (Z-JIMBO) 250 MG tablet  (Status: Discontinued) Take 2 tablets (500 mg total) by mouth once daily. for 5 days 10 tablet 2/5/2025 2/5/2025 Nguyen Reece NP    azithromycin (ZITHROMAX) 500 MG tablet Take 1 tablet (500 mg total) by mouth once daily. for 5 days 5 tablet 2/5/2025 2/10/2025 Nguyen Reece, NP    promethazine-dextromethorphan (PROMETHAZINE-DM) 6.25-15 mg/5 mL Syrp Take 5 mLs by mouth every  4 (four) hours as needed (coughing). 150 mL 2/5/2025 -- Nguyen Reece NP    metoclopramide HCl (REGLAN) 10 MG tablet Take 1 tablet (10 mg total) by mouth every 6 (six) hours. 30 tablet 2/5/2025 -- Nguyen Reece NP    albuterol (VENTOLIN HFA) 90 mcg/actuation inhaler Inhale 2 puffs into the lungs every 4 (four) hours as needed for Wheezing or Shortness of Breath (wheezing). Rescue 18 g 2/5/2025 -- Nguyen Reece NP    benzonatate (TESSALON) 200 MG capsule Take 1 capsule (200 mg total) by mouth 3 (three) times daily as needed for Cough. 60 capsule 2/5/2025 -- Nguyen Reece NP          Follow-up Information       Follow up With Specialties Details Why Contact Info    Penny Stauffer MD Family Medicine Call in 3 days  [address] [facility]  [address]  [place] [address] [phone]      Ellen Becker NP Family Medicine Call in 3 days  [address] [place] [address] [address]  [place] [address] [phone]               Nguyen Reece NP  02/05/25 1320       Nguyen Reece NP  02/05/25 1342       Nguyen Reece NP  02/05/25 1423       Nguyen Reece NP  02/05/25 1433       Nguyen Reece NP  02/05/25 1606

## 2025-02-05 NOTE — DISCHARGE INSTRUCTIONS
Rest, increase fluids, lots of water and liquids.  Tylenol and or Motrin as needed.  The dosing schedule that we discussed prior to discharge.  You are being treated today for pneumonia, sepsis without organ dysfunction, and a urinary tract infection.  You have been given IV antibiotics prior to discharge.  You will start the oral antibiotics in the morning.  You have declined admission today, preferring to manage the illness at home.  Blood culture and urine culture results are pending.  If your blood culture results are positive, you will be called to return for reassessment and probable admission.  You have been given 2 different clinics to call for office recheck, and establish herself as a new patient with either of these clinics. Call for appointment. Return as needed for any issues or concerns as discussed.

## 2025-02-05 NOTE — ED NOTES
Orthostatic vitals    02/05/25 1337 02/05/25 1340 02/05/25 1342   Vital Signs   Pulse (!) 116 (!) 133 (!) 138   SpO2 98 % 97 % 97 %   /70 129/77 119/82   MAP (mmHg) 91 97 95

## 2025-02-05 NOTE — Clinical Note
"Keisha Hernandezidalia Narayan was seen and treated in our emergency department on 2/5/2025.  She may return to work on 02/10/2025.       If you have any questions or concerns, please don't hesitate to call.      Nguyen Reece NP"

## 2025-02-06 LAB
OHS QRS DURATION: 74 MS
OHS QTC CALCULATION: 408 MS

## 2025-02-08 LAB — BACTERIA UR CULT: ABNORMAL

## 2025-02-11 LAB
BACTERIA BLD CULT: NORMAL
BACTERIA BLD CULT: NORMAL

## 2025-04-16 ENCOUNTER — OFFICE VISIT (OUTPATIENT)
Dept: FAMILY MEDICINE | Facility: CLINIC | Age: 30
End: 2025-04-16
Payer: COMMERCIAL

## 2025-04-16 VITALS
DIASTOLIC BLOOD PRESSURE: 64 MMHG | RESPIRATION RATE: 16 BRPM | SYSTOLIC BLOOD PRESSURE: 128 MMHG | HEIGHT: 61 IN | WEIGHT: 184 LBS | HEART RATE: 106 BPM | OXYGEN SATURATION: 98 % | BODY MASS INDEX: 34.74 KG/M2

## 2025-04-16 DIAGNOSIS — Z12.4 CERVICAL CANCER SCREENING: ICD-10-CM

## 2025-04-16 DIAGNOSIS — Z12.11 ENCOUNTER FOR SCREENING FOR MALIGNANT NEOPLASM OF COLON: ICD-10-CM

## 2025-04-16 DIAGNOSIS — E61.1 IRON DEFICIENCY: ICD-10-CM

## 2025-04-16 DIAGNOSIS — I49.3 PVC (PREMATURE VENTRICULAR CONTRACTION): ICD-10-CM

## 2025-04-16 DIAGNOSIS — E66.811 CLASS 1 OBESITY DUE TO DISRUPTION OF MC4R PATHWAY WITH SERIOUS COMORBIDITY AND BODY MASS INDEX (BMI) OF 34.0 TO 34.9 IN ADULT: ICD-10-CM

## 2025-04-16 DIAGNOSIS — Z13.220 ENCOUNTER FOR LIPID SCREENING FOR CARDIOVASCULAR DISEASE: ICD-10-CM

## 2025-04-16 DIAGNOSIS — R07.9 CHEST PAIN, UNSPECIFIED TYPE: ICD-10-CM

## 2025-04-16 DIAGNOSIS — E53.8 B12 DEFICIENCY: ICD-10-CM

## 2025-04-16 DIAGNOSIS — R79.9 ABNORMAL BLOOD CHEMISTRY: Primary | ICD-10-CM

## 2025-04-16 DIAGNOSIS — E55.9 VITAMIN D DEFICIENCY DISEASE: ICD-10-CM

## 2025-04-16 DIAGNOSIS — Z15.2 CLASS 1 OBESITY DUE TO DISRUPTION OF MC4R PATHWAY WITH SERIOUS COMORBIDITY AND BODY MASS INDEX (BMI) OF 34.0 TO 34.9 IN ADULT: ICD-10-CM

## 2025-04-16 DIAGNOSIS — R06.09 DOE (DYSPNEA ON EXERTION): ICD-10-CM

## 2025-04-16 DIAGNOSIS — G44.209 TENSION HEADACHE: ICD-10-CM

## 2025-04-16 DIAGNOSIS — E88.82 CLASS 1 OBESITY DUE TO DISRUPTION OF MC4R PATHWAY WITH SERIOUS COMORBIDITY AND BODY MASS INDEX (BMI) OF 34.0 TO 34.9 IN ADULT: ICD-10-CM

## 2025-04-16 DIAGNOSIS — Z00.00 ROUTINE GENERAL MEDICAL EXAMINATION AT A HEALTH CARE FACILITY: ICD-10-CM

## 2025-04-16 DIAGNOSIS — Z13.6 ENCOUNTER FOR LIPID SCREENING FOR CARDIOVASCULAR DISEASE: ICD-10-CM

## 2025-04-16 PROCEDURE — 99999 PR PBB SHADOW E&M-EST. PATIENT-LVL IV: CPT | Mod: PBBFAC,,, | Performed by: STUDENT IN AN ORGANIZED HEALTH CARE EDUCATION/TRAINING PROGRAM

## 2025-04-16 RX ORDER — RIZATRIPTAN BENZOATE 5 MG/1
5 TABLET, ORALLY DISINTEGRATING ORAL
Qty: 30 TABLET | Refills: 0 | Status: SHIPPED | OUTPATIENT
Start: 2025-04-16 | End: 2025-04-17

## 2025-04-16 RX ORDER — DULOXETIN HYDROCHLORIDE 20 MG/1
20 CAPSULE, DELAYED RELEASE ORAL DAILY
Qty: 30 CAPSULE | Refills: 11 | Status: SHIPPED | OUTPATIENT
Start: 2025-04-16 | End: 2026-04-16

## 2025-04-16 RX ORDER — SEMAGLUTIDE 0.25 MG/.5ML
0.25 INJECTION, SOLUTION SUBCUTANEOUS
Qty: 3 ML | Refills: 0 | Status: SHIPPED | OUTPATIENT
Start: 2025-04-16

## 2025-04-16 NOTE — PROGRESS NOTES
SUBJECTIVE:    CHIEF COMPLAINT:   Chief Complaint   Patient presents with    [facility] Care     Pt complains of having bad headaches and presents concerns of heart and lungs           274}    HISTORY OF PRESENT ILLNESS:  Keisha Narayan is a 30 y.o. female who presents to the clinic today for annual check up   History of Present Illness    CHIEF COMPLAINT:  Ms. Narayan presents today to establish primary care and for evaluation of heart and lung concerns.    CARDIOVASCULAR:  She reports a long history of cardiac issues since teenage years, including chest pressure and palpitations, though these symptoms have improved over time. She has a history of fainting that led to hospitalization several years ago. She experiences lightheadedness and dizziness when bending over, particularly when her head is below a certain angle. She reports shortness of breath with exertion, such as when walking quickly or performing work activities, which generally improves with rest.    RESPIRATORY:  She has a history of pneumonia occurring twice within a four month period (October and approximately four months later), with associated wheezing during these episodes. She was prescribed an inhaler during previous pneumonia episode but found it unhelpful for shortness of breath.    HEADACHES:  She experiences daily headaches, primarily located in the back and top of the head, with pain on one side and back of head. She describes throbbing headaches when overexerted or overworking, and optical headaches accompanied by blurred vision. She reports mild light and sound sensitivity. Her headaches have recently worsened. She takes generic Tylenol 1000 mg for severe episodes with variable relief.    MEDICAL HISTORY:  She has a history of borderline gestational diabetes during pregnancy.    WEIGHT MANAGEMENT:  She reports actively working on weight management, noting a decrease from 189 lbs two weeks ago through lifestyle changes including  healthier eating, increased water intake, and significantly reduced consumption of pasta and breads. We have discussed a trial of Wegovy for obesity, which they are agreeable to. No personal or family history of thyroid cancer, or pancreatitis. The patient is agreeable to healthy eating, increased NAREN recommending at least 30 min a day on most days. Patient is also agreeable to behavior and mindset changes. Discussed starting Wegovy at 0.25 mg and titrating up as tolerated and continuing to monitor her weight with an every 3 month follow up at [facility]. Patient has tried portion control, calorie deficit diet, increased physical activity for 6 months w/o improvement    MEDICATIONS:  She denies taking any daily medications and expresses reluctance to take medication unless symptoms become severe. She reports significant medication sensitivities, including severe fatigue with anti-nausea medications.    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 She denies smoking and drinking.      ROS:  General: -fever, -chills, -fatigue, -weight gain, -weight loss  Eyes: -vision changes, -redness, -discharge, +blurry vision, +photophobia  ENT: -ear pain, -nasal congestion, -sore throat  Cardiovascular: -chest pain, +palpitations, -lower extremity edema, +chest pressure, +orthostatic symptoms  Respiratory: -cough, -shortness of breath, +exertional dyspnea  Gastrointestinal: -abdominal pain, -nausea, -vomiting, -diarrhea, -constipation, -blood in stool  Genitourinary: -dysuria, -hematuria, -frequency  Musculoskeletal: -joint pain, -muscle pain  Skin: -rash, -lesion  Neurological: +headache, +dizziness, -numbness, -tingling, +lightheadedness  Psychiatric: +anxiety, -depression, -sleep difficulty          PAST MEDICAL HISTORY:     274}  Past Medical History:   Diagnosis Date    Anemia     Rh negative state in antepartum period        PAST SURGICAL HISTORY:  Past Surgical  History:   Procedure Laterality Date    DILATION AND CURETTAGE OF UTERUS      INDUCED          SOCIAL HISTORY:  Social History[1]    FAMILY HISTORY:       Family History   Problem Relation Name Age of Onset    Heart disease Father      Diabetes Maternal Grandmother         ALLERGIES AND MEDICATIONS: updated and reviewed.      274}  Review of patient's allergies indicates:  No Known Allergies  Medication List with Changes/Refills   New Medications    DULOXETINE (CYMBALTA) 20 MG CAPSULE    Take 1 capsule (20 mg total) by mouth once daily.    RIZATRIPTAN (MAXALT-MLT) 5 MG DISINTEGRATING TABLET    Take 1 tablet (5 mg total) by mouth as needed for Migraine. May repeat in 2 hours if needed    SEMAGLUTIDE, WEIGHT LOSS, (WEGOVY) 0.25 MG/0.5 ML PNIJ    Inject 0.25 mg into the skin every 7 days.   Discontinued Medications    ALBUTEROL (VENTOLIN HFA) 90 MCG/ACTUATION INHALER    Inhale 2 puffs into the lungs every 4 (four) hours as needed for Wheezing or Shortness of Breath (wheezing). Rescue    BENZONATATE (TESSALON) 200 MG CAPSULE    Take 1 capsule (200 mg total) by mouth 3 (three) times daily as needed for Cough.    DROSPIRENONE-ESTETROL (NEXTSTELLIS) 3 MG- 14.2 MG (28) TAB    Take 1 tablet by mouth Daily.    METOCLOPRAMIDE HCL (REGLAN) 10 MG TABLET    Take 1 tablet (10 mg total) by mouth every 6 (six) hours.    PROMETHAZINE-DEXTROMETHORPHAN (PROMETHAZINE-DM) 6.25-15 MG/5 ML SYRP    Take 5 mLs by mouth every 4 (four) hours as needed (coughing).       SCREENING HISTORY:    274}  Health Maintenance         Date Due Completion Date    Lipid Panel Never done ---    TETANUS VACCINE Never done ---    Cervical Cancer Screening 2024    Influenza Vaccine (1) Never done ---    COVID-19 Vaccine (2024- season) Never done ---    RSV Vaccine (Age 60+ and Pregnant patients) (1 - 1-dose 75+ series) 2069 ---            REVIEW OF SYSTEMS:   ROS    PHYSICAL EXAM:      274}  /64 (BP Location: Left arm,  "Patient Position: Sitting)   Pulse 106   Resp 16   Ht 5' 0.98" (1.549 m)   Wt 83.5 kg (184 lb)   SpO2 98%   BMI 34.78 kg/m²   Wt Readings from Last 3 Encounters:   04/16/25 83.5 kg (184 lb)   02/05/25 84.4 kg (186 lb)   12/02/24 84.4 kg (186 lb)     BP Readings from Last 3 Encounters:   04/16/25 128/64   02/05/25 107/63   12/02/24 124/78     Estimated body mass index is 34.78 kg/m² as calculated from the following:    Height as of this encounter: 5' 0.98" (1.549 m).    Weight as of this encounter: 83.5 kg (184 lb).     Physical Exam  HENT:      Head: Normocephalic and atraumatic.      Nose: Nose normal.      Mouth/Throat:      Mouth: Mucous membranes are dry.      Pharynx: Oropharynx is clear.   Eyes:      Extraocular Movements: Extraocular movements intact.      Conjunctiva/sclera: Conjunctivae normal.   Cardiovascular:      Rate and Rhythm: Regular rhythm. Tachycardia present.   Pulmonary:      Effort: Pulmonary effort is normal.      Breath sounds: Normal breath sounds.   Abdominal:      General: Bowel sounds are normal.      Palpations: Abdomen is soft.   Musculoskeletal:         General: Normal range of motion.      Cervical back: Normal range of motion.   Skin:     General: Skin is warm.   Neurological:      General: No focal deficit present.      Mental Status: She is alert. Mental status is at baseline.   Psychiatric:         Mood and Affect: Mood normal.         Thought Content: Thought content normal.         LABS:   274}  I have reviewed old labs below:  Lab Results   Component Value Date    WBC 18.23 (H) 02/05/2025    HGB 12.8 02/05/2025    HCT 36.9 (L) 02/05/2025    MCV 85 02/05/2025     02/05/2025     (L) 02/05/2025    K 3.4 (L) 02/05/2025     02/05/2025    CALCIUM 9.4 02/05/2025    CO2 20 (L) 02/05/2025     (H) 02/05/2025    BUN 7 02/05/2025    CREATININE 0.8 02/05/2025    ANIONGAP 14 02/05/2025    ESTGFRAFRICA >60.0 08/23/2020    EGFRNONAA >60.0 08/23/2020    PROT 7.9 " 02/05/2025    ALBUMIN 3.2 (L) 02/05/2025    BILITOT 0.7 02/05/2025    ALKPHOS 123 02/05/2025    ALT 18 02/05/2025    AST 17 02/05/2025    INR 1.1 02/05/2025    TSH 1.440 08/16/2021       ASSESSMENT AND PLAN:  274}  Assessment & Plan    IMPRESSION:  Considered multiple factors contributing to symptoms, including shortness of breath, chest pain, frequent headaches, and lightheadedness/dizziness.  Suspect tension headaches based on described pain pattern and associated symptoms.  Considered orthostatic hypotension as potential cause for lightheadedness and dizziness.  Evaluated need for weight management intervention.    ORTHOSTATIC HYPOTENSION:  - Explained concept of orthostatic hypotension and its potential relation to current symptoms.  - Ms. Narayan to monitor BP closely.    HEADACHE:  - Discussed potential causes of headaches, including stress, caffeine intake, hydration, and vision changes.  - Started Maxalt (rizatriptan) at lowest dose for acute headache relief.  - Take on a weekend when not working and before bed to sleep off potential side effects.  - Started headache prevention medication at lowest dose, to be taken at night.  - Ordered MRI Brain to rule out potential intracranial causes of worsening daily headaches.    DEHYDRATION:  - Ms. Narayan to continue efforts to increase water intake and maintain healthy diet and exercise regimen.  - Educated on importance of adequate hydration, recommending at least 8 water bottles per day for a healthy adult.    MEDICATION MANAGEMENT:  - Report any side effects.  - Contact the office if experiencing any side effects from new medications.    CARDIOVASCULAR EVALUATION:  - Ordered comprehensive cardiac workup including stress test, heart ultrasound, and EKG.    PULMONARY EVALUATION:  - Ordered comprehensive pulmonary workup including lung function test.    LABS:  - Ordered comprehensive labs, including vitamin levels, electrolytes, and magnesium.    FOLLOW-UP  CARE:  - Follow up in the next few weeks to a month to review test results and assess symptom improvement.        1. MURRAY (dyspnea on exertion)  - Comprehensive Metabolic Panel; Future  - SCHEDULED EKG 12-LEAD (to Muse); Future  - Stress Echo Which stress agent will be used? Treadmill Exercise; Color Flow Doppler? No; Future  - Complete PFT without bronchodilator; Future    2. Chest pain, unspecified type  - CBC Auto Differential; Future  - SCHEDULED EKG 12-LEAD (to Muse); Future  - Stress Echo Which stress agent will be used? Treadmill Exercise; Color Flow Doppler? No; Future    3. Routine general medical examination at a health care facility    4. Vitamin D deficiency disease  - Vitamin D 25-Hydroxy; Future    5. Encounter for lipid screening for cardiovascular disease    6. PVC (premature ventricular contraction)  - Magnesium; Future  - Phosphorus; Future    7. Abnormal blood chemistry  - Hemoglobin A1C; Future    8. Encounter for screening for malignant neoplasm of colon  - Lipid Panel; Future    9. Tension headache  - TSH; Future  - MRI Brain Without Contrast; Future  - rizatriptan (MAXALT-MLT) 5 MG disintegrating tablet; Take 1 tablet (5 mg total) by mouth as needed for Migraine. May repeat in 2 hours if needed  Dispense: 30 tablet; Refill: 0  - DULoxetine (CYMBALTA) 20 MG capsule; Take 1 capsule (20 mg total) by mouth once daily.  Dispense: 30 capsule; Refill: 11    10. Cervical cancer screening  - Ambulatory referral/consult to Obstetrics / Gynecology; Future    11. Iron deficiency  - Iron and TIBC; Future  - Ferritin; Future    12. B12 deficiency  - Vitamin B12; Future  - Folate; Future    13. 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  Dispense: 3 mL; Refill: 0         Orders Placed This Encounter   Procedures    MRI Brain  Without Contrast    CBC Auto Differential    Comprehensive Metabolic Panel    Hemoglobin A1C    Lipid Panel    TSH    Vitamin D 25-Hydroxy    Iron and TIBC    Ferritin    Vitamin B12    Folate    Magnesium    Phosphorus    Ambulatory referral/consult to Obstetrics / Gynecology    SCHEDULED EKG 12-LEAD (to Muse)    Stress Echo Which stress agent will be used? Treadmill Exercise; Color Flow Doppler? No    Complete PFT without bronchodilator       Follow up in about 4 weeks (around 5/14/2025). or sooner as needed.       [1]   Social History  Socioeconomic History    Marital status:    Tobacco Use    Smoking status: Never    Smokeless tobacco: Never   Substance and Sexual Activity    Alcohol use: No    Drug use: No    Sexual activity: Yes     Partners: Male     Birth control/protection: None     Social Drivers of Health     Financial Resource Strain: Patient Declined (4/16/2025)    Overall Financial Resource Strain (CARDIA)     Difficulty of Paying Living Expenses: Patient declined   Food Insecurity: Patient Declined (4/16/2025)    Hunger Vital Sign     Worried About Running Out of Food in the Last Year: Patient declined     Ran Out of Food in the Last Year: Patient declined   Transportation Needs: Patient Declined (4/16/2025)    PRAPARE - Transportation     Lack of Transportation (Medical): Patient declined     Lack of Transportation (Non-Medical): Patient declined   Physical Activity: Inactive (4/16/2025)    Exercise Vital Sign     Days of Exercise per Week: 0 days     Minutes of Exercise per Session: 0 min   Stress: No Stress Concern Present (4/16/2025)    [employer] - Occupational Stress Questionnaire     Feeling of Stress : Only a little   Housing Stability: Patient Declined (4/16/2025)    Housing Stability Vital Sign     Unable to Pay for Housing in the Last Year: Patient declined     Number of Times Moved in the Last Year: 0     Homeless in the Last Year: Patient declined

## 2025-04-17 ENCOUNTER — PATIENT MESSAGE (OUTPATIENT)
Dept: FAMILY MEDICINE | Facility: CLINIC | Age: 30
End: 2025-04-17
Payer: COMMERCIAL

## 2025-04-17 DIAGNOSIS — G44.209 TENSION HEADACHE: Primary | ICD-10-CM

## 2025-04-17 RX ORDER — UBROGEPANT 100 MG/1
100 TABLET ORAL
Qty: 30 TABLET | Refills: 1 | Status: SHIPPED | OUTPATIENT
Start: 2025-04-17

## 2025-04-19 ENCOUNTER — LAB VISIT (OUTPATIENT)
Dept: LAB | Facility: HOSPITAL | Age: 30
End: 2025-04-19
Attending: STUDENT IN AN ORGANIZED HEALTH CARE EDUCATION/TRAINING PROGRAM
Payer: COMMERCIAL

## 2025-04-19 DIAGNOSIS — R79.9 ABNORMAL BLOOD CHEMISTRY: ICD-10-CM

## 2025-04-19 DIAGNOSIS — E61.1 IRON DEFICIENCY: ICD-10-CM

## 2025-04-19 DIAGNOSIS — R07.9 CHEST PAIN, UNSPECIFIED TYPE: ICD-10-CM

## 2025-04-19 DIAGNOSIS — R06.09 DOE (DYSPNEA ON EXERTION): ICD-10-CM

## 2025-04-19 DIAGNOSIS — I49.3 PVC (PREMATURE VENTRICULAR CONTRACTION): ICD-10-CM

## 2025-04-19 DIAGNOSIS — E55.9 VITAMIN D DEFICIENCY DISEASE: ICD-10-CM

## 2025-04-19 DIAGNOSIS — G44.209 TENSION HEADACHE: ICD-10-CM

## 2025-04-19 DIAGNOSIS — Z12.11 ENCOUNTER FOR SCREENING FOR MALIGNANT NEOPLASM OF COLON: ICD-10-CM

## 2025-04-19 DIAGNOSIS — E53.8 B12 DEFICIENCY: ICD-10-CM

## 2025-04-19 LAB
25(OH)D3+25(OH)D2 SERPL-MCNC: 18 NG/ML (ref 30–96)
ABSOLUTE EOSINOPHIL (OHS): 0.03 K/UL
ABSOLUTE MONOCYTE (OHS): 0.37 K/UL (ref 0.3–1)
ABSOLUTE NEUTROPHIL COUNT (OHS): 2.72 K/UL (ref 1.8–7.7)
ALBUMIN SERPL BCP-MCNC: 3.9 G/DL (ref 3.5–5.2)
ALP SERPL-CCNC: 57 UNIT/L (ref 40–150)
ALT SERPL W/O P-5'-P-CCNC: 17 UNIT/L (ref 10–44)
ANION GAP (OHS): 9 MMOL/L (ref 8–16)
AST SERPL-CCNC: 17 UNIT/L (ref 11–45)
BASOPHILS # BLD AUTO: 0.03 K/UL
BASOPHILS NFR BLD AUTO: 0.6 %
BILIRUB SERPL-MCNC: 0.4 MG/DL (ref 0.1–1)
BUN SERPL-MCNC: 13 MG/DL (ref 6–20)
CALCIUM SERPL-MCNC: 9.2 MG/DL (ref 8.7–10.5)
CHLORIDE SERPL-SCNC: 108 MMOL/L (ref 95–110)
CHOLEST SERPL-MCNC: 166 MG/DL (ref 120–199)
CHOLEST/HDLC SERPL: 5.7 {RATIO} (ref 2–5)
CO2 SERPL-SCNC: 23 MMOL/L (ref 23–29)
CREAT SERPL-MCNC: 0.7 MG/DL (ref 0.5–1.4)
EAG (OHS): 97 MG/DL (ref 68–131)
ERYTHROCYTE [DISTWIDTH] IN BLOOD BY AUTOMATED COUNT: 11.6 % (ref 11.5–14.5)
FERRITIN SERPL-MCNC: 25 NG/ML (ref 20–300)
FOLATE SERPL-MCNC: 12 NG/ML (ref 4–24)
GFR SERPLBLD CREATININE-BSD FMLA CKD-EPI: >60 ML/MIN/1.73/M2
GLUCOSE SERPL-MCNC: 85 MG/DL (ref 70–110)
HBA1C MFR BLD: 5 % (ref 4–5.6)
HCT VFR BLD AUTO: 39.8 % (ref 37–48.5)
HDLC SERPL-MCNC: 29 MG/DL (ref 40–75)
HDLC SERPL: 17.5 % (ref 20–50)
HGB BLD-MCNC: 14 GM/DL (ref 12–16)
IMM GRANULOCYTES # BLD AUTO: 0.01 K/UL (ref 0–0.04)
IMM GRANULOCYTES NFR BLD AUTO: 0.2 % (ref 0–0.5)
IRON SATN MFR SERPL: 13 % (ref 20–50)
IRON SERPL-MCNC: 62 UG/DL (ref 30–160)
LDLC SERPL CALC-MCNC: 112.2 MG/DL (ref 63–159)
LYMPHOCYTES # BLD AUTO: 1.83 K/UL (ref 1–4.8)
MAGNESIUM SERPL-MCNC: 1.8 MG/DL (ref 1.6–2.6)
MCH RBC QN AUTO: 29.4 PG (ref 27–31)
MCHC RBC AUTO-ENTMCNC: 35.2 G/DL (ref 32–36)
MCV RBC AUTO: 83 FL (ref 82–98)
NONHDLC SERPL-MCNC: 137 MG/DL
NUCLEATED RBC (/100WBC) (OHS): 0 /100 WBC
PHOSPHATE SERPL-MCNC: 3.2 MG/DL (ref 2.7–4.5)
PLATELET # BLD AUTO: 356 K/UL (ref 150–450)
PMV BLD AUTO: 9.3 FL (ref 9.2–12.9)
POTASSIUM SERPL-SCNC: 4.2 MMOL/L (ref 3.5–5.1)
PROT SERPL-MCNC: 7.3 GM/DL (ref 6–8.4)
RBC # BLD AUTO: 4.77 M/UL (ref 4–5.4)
RELATIVE EOSINOPHIL (OHS): 0.6 %
RELATIVE LYMPHOCYTE (OHS): 36.7 % (ref 18–48)
RELATIVE MONOCYTE (OHS): 7.4 % (ref 4–15)
RELATIVE NEUTROPHIL (OHS): 54.5 % (ref 38–73)
SODIUM SERPL-SCNC: 140 MMOL/L (ref 136–145)
TIBC SERPL-MCNC: 478 UG/DL (ref 250–450)
TRANSFERRIN SERPL-MCNC: 323 MG/DL (ref 200–375)
TRIGL SERPL-MCNC: 124 MG/DL (ref 30–150)
TSH SERPL-ACNC: 1.48 UIU/ML (ref 0.4–4)
VIT B12 SERPL-MCNC: 398 PG/ML (ref 210–950)
WBC # BLD AUTO: 4.99 K/UL (ref 3.9–12.7)

## 2025-04-19 PROCEDURE — 82607 VITAMIN B-12: CPT

## 2025-04-19 PROCEDURE — 83735 ASSAY OF MAGNESIUM: CPT

## 2025-04-19 PROCEDURE — 84100 ASSAY OF PHOSPHORUS: CPT

## 2025-04-19 PROCEDURE — 84443 ASSAY THYROID STIM HORMONE: CPT

## 2025-04-19 PROCEDURE — 82746 ASSAY OF FOLIC ACID SERUM: CPT

## 2025-04-19 PROCEDURE — 85025 COMPLETE CBC W/AUTO DIFF WBC: CPT

## 2025-04-19 PROCEDURE — 84466 ASSAY OF TRANSFERRIN: CPT

## 2025-04-19 PROCEDURE — 83036 HEMOGLOBIN GLYCOSYLATED A1C: CPT

## 2025-04-19 PROCEDURE — 82306 VITAMIN D 25 HYDROXY: CPT

## 2025-04-19 PROCEDURE — 80053 COMPREHEN METABOLIC PANEL: CPT

## 2025-04-19 PROCEDURE — 36415 COLL VENOUS BLD VENIPUNCTURE: CPT

## 2025-04-19 PROCEDURE — 82728 ASSAY OF FERRITIN: CPT

## 2025-04-19 PROCEDURE — 80061 LIPID PANEL: CPT

## 2025-04-20 ENCOUNTER — PATIENT MESSAGE (OUTPATIENT)
Dept: FAMILY MEDICINE | Facility: CLINIC | Age: 30
End: 2025-04-20
Payer: COMMERCIAL

## 2025-04-20 ENCOUNTER — RESULTS FOLLOW-UP (OUTPATIENT)
Dept: FAMILY MEDICINE | Facility: CLINIC | Age: 30
End: 2025-04-20

## 2025-04-20 ENCOUNTER — HOSPITAL ENCOUNTER (EMERGENCY)
Facility: HOSPITAL | Age: 30
Discharge: HOME | End: 2025-04-21
Attending: FAMILY MEDICINE
Payer: COMMERCIAL

## 2025-04-20 DIAGNOSIS — G89.29 CHRONIC NONINTRACTABLE HEADACHE, UNSPECIFIED HEADACHE TYPE: Primary | ICD-10-CM

## 2025-04-20 DIAGNOSIS — R51.9 CHRONIC NONINTRACTABLE HEADACHE, UNSPECIFIED HEADACHE TYPE: Primary | ICD-10-CM

## 2025-04-20 DIAGNOSIS — R42 LIGHTHEADED: ICD-10-CM

## 2025-04-20 LAB — B-HCG UR QL: NEGATIVE

## 2025-04-20 PROCEDURE — 70450 CT HEAD/BRAIN W/O DYE: CPT | Mod: TC

## 2025-04-20 PROCEDURE — 81025 URINE PREGNANCY TEST: CPT | Performed by: FAMILY MEDICINE

## 2025-04-20 PROCEDURE — 70450 CT HEAD/BRAIN W/O DYE: CPT | Mod: 26,,, | Performed by: RADIOLOGY

## 2025-04-20 PROCEDURE — 99285 EMERGENCY DEPT VISIT HI MDM: CPT | Mod: 25

## 2025-04-20 RX ORDER — ERGOCALCIFEROL 1.25 MG/1
50000 CAPSULE ORAL
Qty: 12 CAPSULE | Refills: 3 | Status: SHIPPED | OUTPATIENT
Start: 2025-04-20

## 2025-04-21 ENCOUNTER — PATIENT MESSAGE (OUTPATIENT)
Dept: FAMILY MEDICINE | Facility: CLINIC | Age: 30
End: 2025-04-21
Payer: COMMERCIAL

## 2025-04-21 VITALS
HEIGHT: 61 IN | DIASTOLIC BLOOD PRESSURE: 65 MMHG | OXYGEN SATURATION: 98 % | HEART RATE: 75 BPM | WEIGHT: 184 LBS | BODY MASS INDEX: 34.74 KG/M2 | RESPIRATION RATE: 16 BRPM | TEMPERATURE: 98 F | SYSTOLIC BLOOD PRESSURE: 120 MMHG

## 2025-04-21 LAB
OHS QRS DURATION: 78 MS
OHS QTC CALCULATION: 400 MS

## 2025-04-21 PROCEDURE — 93010 ELECTROCARDIOGRAM REPORT: CPT | Mod: ,,, | Performed by: INTERNAL MEDICINE

## 2025-04-21 PROCEDURE — 93005 ELECTROCARDIOGRAM TRACING: CPT

## 2025-04-21 NOTE — ED PROVIDER NOTES
Encounter Date: 2025       History     Chief Complaint   Patient presents with    Headache     Presents with c/o low BP readings, most recent noted at 90/63 at home. Reports ongoing HA x several weeks, with intermittent lightheadedness/dizziness,nausea, and fatigue. Denies starting any new medications. States symptoms are not consistently correlated with low BP readings. Took Tylenol with minimal relief of HA. No acute distress noted on presentation.      HPI  30-year-old female presenting here for evaluation of intermittent lightheadedness as well as headaches.  She reports that she is taking her blood pressure at home and it is low 90/60.  She states that when she gets it checked at her PCPs office it is typically normal.  Advised that she check her blood pressure cuff against her PCPs.  She denies any fever neck pain or stiffness.  She reports that she has been following up with her PCP who has ordered echocardiogram and MRI of her brain, she had lab work drawn on Saturday as well.  She reports that she was prescribed migraine headache medicine and advised that she can double it but she has not attempted to do this yet because she states she does not like taking medicine.  She denies any abdominal pain    Review of systems otherwise negative    Review of patient's allergies indicates:  No Known Allergies  Past Medical History:   Diagnosis Date    Anemia     Rh negative state in antepartum period      Past Surgical History:   Procedure Laterality Date    DILATION AND CURETTAGE OF UTERUS      INDUCED        Family History   Problem Relation Name Age of Onset    Heart disease Father      Diabetes Maternal Grandmother       Social History[1]  Review of Systems    Physical Exam     Initial Vitals [25 2311]   BP Pulse Resp Temp SpO2   137/83 72 16 98.2 °F (36.8 °C) 98 %      MAP       --         Physical Exam    Nursing note and vitals reviewed.  Constitutional: She appears well-developed and  well-nourished. She is not diaphoretic. She is active.   HENT:   Head: Normocephalic and atraumatic.   Right Ear: External ear normal.   Left Ear: External ear normal.   Nose: Nose normal.   Eyes: Right eye exhibits no discharge. Left eye exhibits no discharge.   Neck: Neck supple. No tracheal deviation present. No JVD present.   No meningismus   Cardiovascular:  Normal rate, regular rhythm and intact distal pulses.           Pulmonary/Chest: Breath sounds normal. No respiratory distress.   Abdominal: Abdomen is soft. She exhibits no distension. There is no abdominal tenderness. There is no rebound and no guarding.   Musculoskeletal:         General: No edema.      Cervical back: Neck supple.     Neurological: She is alert and oriented to person, place, and time. She has normal strength. GCS score is 15. GCS eye subscore is 4. GCS verbal subscore is 5. GCS motor subscore is 6.   Normal gait   Skin: Skin is warm and dry. Capillary refill takes less than 2 seconds.   Psychiatric: She has a normal mood and affect. Her behavior is normal.         ED Course   Procedures  Labs Reviewed   PREGNANCY TEST, URINE RAPID - Normal       Result Value    hCG Qualitative, Urine Negative          ECG Results              EKG 12-lead (Final result)        Collection Time Result Time QRS Duration OHS QTC Calculation    04/21/25 00:20:23 04/21/25 09:08:52 78 400                     Final result by Interface, Lab In [facility] (04/21/25 09:08:59)                   Narrative:    Test Reason : R42,    Vent. Rate :  64 BPM     Atrial Rate :  64 BPM     P-R Int : 138 ms          QRS Dur :  78 ms      QT Int : 388 ms       P-R-T Axes :  29  30  12 degrees    QTcB Int : 400 ms    Normal sinus rhythm with sinus arrhythmia  Low voltage QRS  Borderline Abnormal ECG  When compared with ECG of 05-Feb-2025 13:51,  Vent. rate has decreased by  52 bpm  Non-specific change in ST segment in Anterior-lateral leads  T wave inversion no longer evident in  Anterior-lateral leads  Confirmed by Shailesh Ledbetter ([id number]) on 4/21/2025 9:08:50 AM    Referred By: AAAREFERRAL SELF           Confirmed By: Shailesh Ledbetter                                  Imaging Results              CT Head Without Contrast (Final result)  Result time 04/20/25 23:56:20      Final result by Jin Feldman DO (04/20/25 23:56:20)                   Impression:      No acute intracranial abnormality.      Electronically signed by: Jin Feldman  Date:    04/20/2025  Time:    23:56               Narrative:    EXAMINATION:  CT HEAD WITHOUT CONTRAST    CLINICAL HISTORY:  Headache, chronic, new features or increased frequency;    TECHNIQUE:  Low dose axial CT images obtained throughout the head without intravenous contrast. Sagittal and coronal reconstructions were performed.    COMPARISON:  None available.    FINDINGS:  Ventricles and sulci are normal in size for age without evidence of hydrocephalus. No extra-axial blood or fluid collections.  The brain parenchyma is normal. No parenchymal mass, hemorrhage, edema or major vascular distribution infarct.    No calvarial fracture.  The scalp is unremarkable.  Bilateral paranasal sinuses and mastoid air cells are clear.                                       Medications - No data to display  Medical Decision Making  Amount and/or Complexity of Data Reviewed  Labs:  Decision-making details documented in ED Course.  Radiology: ordered. Decision-making details documented in ED Course.               ED Course as of 04/22/25 0506   Mon Apr 21, 2025   0001 hCG Qualitative, Urine: Negative [JW]   0001 CT Head Without Contrast  FINDINGS:  Ventricles and sulci are normal in size for age without evidence of hydrocephalus. No extra-axial blood or fluid collections.  The brain parenchyma is normal. No parenchymal mass, hemorrhage, edema or major vascular distribution infarct.     No calvarial fracture.  The scalp is unremarkable.  Bilateral paranasal sinuses and mastoid air  cells are clear.     Impression:     No acute intracranial abnormality.   [JW]   0038 EKG independently interpreted sinus rhythm with sinus arrhythmia rate of 64 normal axis,  [JW]      ED Course User Index  [JW] Shailesh Bolaños DO          I reviewed the patient's lab work from Saturday.  It showed low vitamin-D.  Low iron level.  Normal TSH.  A1c normal.  Hemoglobin normal.  It was previously recommended that she begin taking iron supplements and she has for the past day.  Offered CT head for the patient she was advised that this would not be as ideal as the MRI she has ordered for this week however she wanted to pursue this and I do not think it is unreasonable.  CT head was negative.  EKG as above.  Patient declined any medication in the ED.  She is stable for outpatient follow-up     Strict return precautions given                 Clinical Impression:  Final diagnoses:  [R42] Lightheaded  [R51.9, G89.29] Chronic nonintractable headache, unspecified headache type (Primary)          ED Disposition Condition    Discharge Stable          ED Prescriptions    None       Follow-up Information       Follow up With Specialties Details Why Contact Info    Ravin Perikns MD Family Medicine Call today for additional recommendations and to schedule follow up [address] [facility] [address] [phone]      [facility] Emergency Dept Emergency Medicine Go to  As needed, If symptoms worsen [address] [facility] [address] [phone]               [1]   Social History  Tobacco Use    Smoking status: Never    Smokeless tobacco: Never   Substance Use Topics    Alcohol use: No    Drug use: No        Shailesh Bolaños DO  04/22/25 0537

## 2025-04-23 ENCOUNTER — PATIENT MESSAGE (OUTPATIENT)
Dept: FAMILY MEDICINE | Facility: CLINIC | Age: 30
End: 2025-04-23
Payer: COMMERCIAL

## 2025-04-23 ENCOUNTER — HOSPITAL ENCOUNTER (OUTPATIENT)
Dept: CARDIOLOGY | Facility: HOSPITAL | Age: 30
Discharge: HOME | End: 2025-04-23
Attending: STUDENT IN AN ORGANIZED HEALTH CARE EDUCATION/TRAINING PROGRAM
Payer: COMMERCIAL

## 2025-04-23 VITALS
BODY MASS INDEX: 34.74 KG/M2 | HEIGHT: 61 IN | WEIGHT: 184 LBS | HEART RATE: 109 BPM | DIASTOLIC BLOOD PRESSURE: 86 MMHG | SYSTOLIC BLOOD PRESSURE: 136 MMHG

## 2025-04-23 DIAGNOSIS — G47.00 INSOMNIA W/ SLEEP APNEA: Primary | ICD-10-CM

## 2025-04-23 DIAGNOSIS — G47.30 INSOMNIA W/ SLEEP APNEA: Primary | ICD-10-CM

## 2025-04-23 DIAGNOSIS — R07.9 CHEST PAIN, UNSPECIFIED TYPE: ICD-10-CM

## 2025-04-23 DIAGNOSIS — R06.09 DOE (DYSPNEA ON EXERTION): ICD-10-CM

## 2025-04-23 LAB
AORTIC ROOT ANNULUS: 2.6 CM
AORTIC VALVE CUSP SEPERATION: 1.53 CM
ASCENDING AORTA: 2.5 CM
BSA FOR ECHO PROCEDURE: 1.9 M2
CV ECHO LV RWT: 0.44 CM
CV STRESS BASE HR: 94 BPM
DIASTOLIC BLOOD PRESSURE: 86 MMHG
DOP CALC LVOT AREA: 2.8 CM2
DOP CALC LVOT DIAMETER: 1.9 CM
ECHO LV POSTERIOR WALL: 0.9 CM (ref 0.6–1.1)
EJECTION FRACTION: 70 %
FRACTIONAL SHORTENING: 39 % (ref 28–44)
INTERVENTRICULAR SEPTUM: 0.8 CM (ref 0.6–1.1)
LA MAJOR: 3.8 CM
LA MINOR: 2.3 CM
LEFT ATRIUM AREA SYSTOLIC (APICAL 2 CHAMBER): 4.69 CM2
LEFT ATRIUM AREA SYSTOLIC (APICAL 4 CHAMBER): 7.02 CM2
LEFT ATRIUM SIZE: 3.7 CM
LEFT ATRIUM VOLUME INDEX MOD: 4 ML/M2
LEFT ATRIUM VOLUME MOD: 8 ML
LEFT INTERNAL DIMENSION IN SYSTOLE: 2.5 CM (ref 2.1–4)
LEFT VENTRICLE DIASTOLIC VOLUME INDEX: 41.76 ML/M2
LEFT VENTRICLE DIASTOLIC VOLUME: 76 ML
LEFT VENTRICLE END SYSTOLIC VOLUME APICAL 2 CHAMBER: 5.16 ML
LEFT VENTRICLE END SYSTOLIC VOLUME APICAL 4 CHAMBER: 10.53 ML
LEFT VENTRICLE MASS INDEX: 58 G/M2
LEFT VENTRICLE SYSTOLIC VOLUME INDEX: 12.6 ML/M2
LEFT VENTRICLE SYSTOLIC VOLUME: 23 ML
LEFT VENTRICULAR INTERNAL DIMENSION IN DIASTOLE: 4.1 CM (ref 3.5–6)
LEFT VENTRICULAR MASS: 105.6 G
LVED V (TEICH): 75.82 ML
LVES V (TEICH): 22.55 ML
OHS CV CPX 1 MINUTE RECOVERY HEART RATE: 136 BPM
OHS CV CPX 85 PERCENT MAX PREDICTED HEART RATE MALE: 161
OHS CV CPX ESTIMATED METS: 9
OHS CV CPX MAX PREDICTED HEART RATE: 189
OHS CV CPX PATIENT IS FEMALE: 1
OHS CV CPX PATIENT IS MALE: 0
OHS CV CPX PEAK DIASTOLIC BLOOD PRESSURE: 110 MMHG
OHS CV CPX PEAK HEAR RATE: 179 BPM
OHS CV CPX PEAK RATE PRESSURE PRODUCT: NORMAL
OHS CV CPX PEAK SYSTOLIC BLOOD PRESSURE: 202 MMHG
OHS CV CPX PERCENT MAX PREDICTED HEART RATE ACHIEVED: 100
OHS CV CPX RATE PRESSURE PRODUCT PRESENTING: NORMAL
OHS CV RV/LV RATIO: 0.76 CM
RA MAJOR: 3.86 CM
RA WIDTH: 2.77 CM
RIGHT VENTRICLE DIASTOLIC BASEL DIMENSION: 3.1 CM
RIGHT VENTRICLE DIASTOLIC LENGTH: 5.3 CM
RIGHT VENTRICLE DIASTOLIC MID DIMENSION: 1.7 CM
RIGHT VENTRICULAR END-DIASTOLIC DIMENSION: 3.05 CM
RIGHT VENTRICULAR LENGTH IN DIASTOLE (APICAL 4-CHAMBER VIEW): 5.3 CM
RV MID DIAMA: 1.71 CM
SINUS: 2.42 CM
STJ: 2.3 CM
STRESS ECHO POST EXERCISE DUR MIN: 7 MINUTES
STRESS ECHO POST EXERCISE DUR SEC: 25 SECONDS
SYSTOLIC BLOOD PRESSURE: 131 MMHG
Z-SCORE OF LEFT VENTRICULAR DIMENSION IN END DIASTOLE: -2.06
Z-SCORE OF LEFT VENTRICULAR DIMENSION IN END SYSTOLE: -1.73

## 2025-04-23 PROCEDURE — 93351 STRESS TTE COMPLETE: CPT

## 2025-04-23 PROCEDURE — 93351 STRESS TTE COMPLETE: CPT | Mod: 26,,, | Performed by: INTERNAL MEDICINE

## 2025-04-23 NOTE — NURSING NOTE
2 patient identifier name and date of birth confirmed as stated by patient and matched with armband. Informed consent obtained. Dr Ledbetter here.

## 2025-04-28 ENCOUNTER — HOSPITAL ENCOUNTER (OUTPATIENT)
Dept: RADIOLOGY | Facility: HOSPITAL | Age: 30
Discharge: HOME | End: 2025-04-28
Attending: STUDENT IN AN ORGANIZED HEALTH CARE EDUCATION/TRAINING PROGRAM
Payer: COMMERCIAL

## 2025-04-28 DIAGNOSIS — G44.209 TENSION HEADACHE: ICD-10-CM

## 2025-04-28 PROCEDURE — 70551 MRI BRAIN STEM W/O DYE: CPT | Mod: TC

## 2025-04-28 PROCEDURE — 70551 MRI BRAIN STEM W/O DYE: CPT | Mod: 26,,, | Performed by: RADIOLOGY

## 2025-05-06 ENCOUNTER — OFFICE VISIT (OUTPATIENT)
Dept: FAMILY MEDICINE | Facility: CLINIC | Age: 30
End: 2025-05-06
Payer: COMMERCIAL

## 2025-05-06 ENCOUNTER — HOSPITAL ENCOUNTER (OUTPATIENT)
Dept: RADIOLOGY | Facility: HOSPITAL | Age: 30
Discharge: HOME | End: 2025-05-06
Attending: STUDENT IN AN ORGANIZED HEALTH CARE EDUCATION/TRAINING PROGRAM
Payer: COMMERCIAL

## 2025-05-06 ENCOUNTER — TELEPHONE (OUTPATIENT)
Dept: FAMILY MEDICINE | Facility: CLINIC | Age: 30
End: 2025-05-06
Payer: COMMERCIAL

## 2025-05-06 ENCOUNTER — RESULTS FOLLOW-UP (OUTPATIENT)
Dept: FAMILY MEDICINE | Facility: CLINIC | Age: 30
End: 2025-05-06
Payer: COMMERCIAL

## 2025-05-06 VITALS
RESPIRATION RATE: 15 BRPM | WEIGHT: 182 LBS | OXYGEN SATURATION: 100 % | SYSTOLIC BLOOD PRESSURE: 128 MMHG | BODY MASS INDEX: 34.36 KG/M2 | HEART RATE: 95 BPM | DIASTOLIC BLOOD PRESSURE: 72 MMHG | HEIGHT: 61 IN

## 2025-05-06 DIAGNOSIS — R06.02 SHORTNESS OF BREATH: Primary | ICD-10-CM

## 2025-05-06 DIAGNOSIS — R06.02 SHORTNESS OF BREATH: ICD-10-CM

## 2025-05-06 PROCEDURE — 71046 X-RAY EXAM CHEST 2 VIEWS: CPT | Mod: 26,,, | Performed by: RADIOLOGY

## 2025-05-06 PROCEDURE — 99999 PR PBB SHADOW E&M-EST. PATIENT-LVL IV: CPT | Mod: PBBFAC,,, | Performed by: STUDENT IN AN ORGANIZED HEALTH CARE EDUCATION/TRAINING PROGRAM

## 2025-05-06 PROCEDURE — 3074F SYST BP LT 130 MM HG: CPT | Mod: CPTII,S$GLB,, | Performed by: STUDENT IN AN ORGANIZED HEALTH CARE EDUCATION/TRAINING PROGRAM

## 2025-05-06 PROCEDURE — 99214 OFFICE O/P EST MOD 30 MIN: CPT | Mod: S$GLB,,, | Performed by: STUDENT IN AN ORGANIZED HEALTH CARE EDUCATION/TRAINING PROGRAM

## 2025-05-06 PROCEDURE — 71046 X-RAY EXAM CHEST 2 VIEWS: CPT | Mod: TC

## 2025-05-06 PROCEDURE — 1159F MED LIST DOCD IN RCRD: CPT | Mod: CPTII,S$GLB,, | Performed by: STUDENT IN AN ORGANIZED HEALTH CARE EDUCATION/TRAINING PROGRAM

## 2025-05-06 PROCEDURE — 3078F DIAST BP <80 MM HG: CPT | Mod: CPTII,S$GLB,, | Performed by: STUDENT IN AN ORGANIZED HEALTH CARE EDUCATION/TRAINING PROGRAM

## 2025-05-06 PROCEDURE — 3044F HG A1C LEVEL LT 7.0%: CPT | Mod: CPTII,S$GLB,, | Performed by: STUDENT IN AN ORGANIZED HEALTH CARE EDUCATION/TRAINING PROGRAM

## 2025-05-06 PROCEDURE — 3008F BODY MASS INDEX DOCD: CPT | Mod: CPTII,S$GLB,, | Performed by: STUDENT IN AN ORGANIZED HEALTH CARE EDUCATION/TRAINING PROGRAM

## 2025-05-06 RX ORDER — GUAIFENESIN 600 MG/1
1200 TABLET, EXTENDED RELEASE ORAL 2 TIMES DAILY
Qty: 30 TABLET | Refills: 0 | Status: SHIPPED | OUTPATIENT
Start: 2025-05-06 | End: 2025-05-16

## 2025-05-06 RX ORDER — AZELASTINE 1 MG/ML
1 SPRAY, METERED NASAL 2 TIMES DAILY
Qty: 18.2 ML | Refills: 0 | Status: SHIPPED | OUTPATIENT
Start: 2025-05-06 | End: 2026-05-06

## 2025-05-06 RX ORDER — AZITHROMYCIN 250 MG/1
TABLET, FILM COATED ORAL
Qty: 6 TABLET | Refills: 0 | Status: SHIPPED | OUTPATIENT
Start: 2025-05-06 | End: 2025-05-11

## 2025-05-06 RX ORDER — BENZONATATE 200 MG/1
200 CAPSULE ORAL 3 TIMES DAILY PRN
Qty: 30 CAPSULE | Refills: 0 | Status: SHIPPED | OUTPATIENT
Start: 2025-05-06 | End: 2025-05-16

## 2025-05-06 RX ORDER — PROMETHAZINE HYDROCHLORIDE AND DEXTROMETHORPHAN HYDROBROMIDE 6.25; 15 MG/5ML; MG/5ML
5 SYRUP ORAL EVERY 4 HOURS PRN
Qty: 118 ML | Refills: 0 | Status: SHIPPED | OUTPATIENT
Start: 2025-05-06 | End: 2025-05-16

## 2025-05-06 RX ORDER — FLUTICASONE PROPIONATE 50 MCG
1 SPRAY, SUSPENSION (ML) NASAL DAILY
Qty: 18.2 ML | Refills: 0 | Status: SHIPPED | OUTPATIENT
Start: 2025-05-06

## 2025-05-06 RX ORDER — ALBUTEROL SULFATE 90 UG/1
2 INHALANT RESPIRATORY (INHALATION) EVERY 6 HOURS PRN
Qty: 18 G | Refills: 0 | Status: SHIPPED | OUTPATIENT
Start: 2025-05-06 | End: 2026-05-06

## 2025-05-06 NOTE — TELEPHONE ENCOUNTER
----- Message from Jennifer sent at 5/6/2025 11:17 AM CDT -----  Type: Needs Medical AdviceWho Called:  sleep lab [facility] Call Back Number: [phone] Additional Information: requesting to confirm and verify pts contact info that was sent via fax please advise

## 2025-05-06 NOTE — TELEPHONE ENCOUNTER
Returned call to Sleep Lab , spoke w/ Morelia  Confirmed pt address and telephone number. Morelia voiced understanding

## 2025-05-07 NOTE — PROGRESS NOTES
SUBJECTIVE:    CHIEF COMPLAINT:   Chief Complaint   Patient presents with    Illness     Pt thinks she may have the start of pneumonia            274}    HISTORY OF PRESENT ILLNESS:  Keisha Narayan is a 30 y.o. female who presents to the clinic today   History of Present Illness    CHIEF COMPLAINT:  Ms. Narayan presents today with concern for developing pneumonia    HISTORY OF PRESENT ILLNESS:  She presents with cold-like symptoms that started 2 days ago including coughing, sneezing, runny nose, and fatigue. She experiences shortness of breath with movement and talking, particularly when bending over or speaking rapidly. She describes chest pressure localized to the middle of chest. Stress test unremarkable    RESPIRATORY HISTORY:  She has a history of recurrent pneumonia with most recent episode in February 2023 complicated by sepsis. Prior episode occurred in October 2022. CT in October initially showed negative findings but subsequent imaging revealed pneumonia. She previously trialed an inhaler during pneumonia episode with limited therapeutic benefit.    MEDICAL HISTORY:  She has a history of dizziness, lightheadedness, and double vision during youth, particularly while participating in cross-country running. Stress test was normal without evidence of cardiac abnormalities or blockages. D-dimer test was positive.    LABS:  Iron studies showed low iron saturation at 13%, with ferritin levels at the lower end of normal range.    MEDICATIONS:  She currently takes vitamin D supplement.    GYNECOLOGIC HISTORY:  Last Pap smear was performed 3 years ago during her last pregnancy.      ROS:  General: -fever, -chills, +fatigue, -weight gain, -weight loss  Eyes: -vision changes, -redness, -discharge, +double vision  ENT: -ear pain, -nasal congestion, -sore throat, +frequent sneezing, +runny nose, +nasal discharge  Cardiovascular: -chest pain, -palpitations, -lower extremity edema, +chest pressure, +orthostatic  symptoms  Respiratory: +cough, +shortness of breath, +exertional dyspnea, +dyspnea at rest  Gastrointestinal: -abdominal pain, -nausea, -vomiting, -diarrhea, -constipation, -blood in stool  Genitourinary: -dysuria, -hematuria, -frequency  Musculoskeletal: -joint pain, -muscle pain  Skin: -rash, -lesion  Neurological: -headache, +dizziness, -numbness, -tingling, +lightheadedness  Psychiatric: -anxiety, -depression, -sleep difficulty          PAST MEDICAL HISTORY:     274}  Past Medical History:   Diagnosis Date    Anemia     Rh negative state in antepartum period        PAST SURGICAL HISTORY:  Past Surgical History:   Procedure Laterality Date    DILATION AND CURETTAGE OF UTERUS      INDUCED          SOCIAL HISTORY:  Social History[1]    FAMILY HISTORY:       Family History   Problem Relation Name Age of Onset    Heart disease Father      Diabetes Maternal Grandmother         ALLERGIES AND MEDICATIONS: updated and reviewed.      274}  Review of patient's allergies indicates:  No Known Allergies  Medication List with Changes/Refills   New Medications    ALBUTEROL (VENTOLIN HFA) 90 MCG/ACTUATION INHALER    Inhale 2 puffs into the lungs every 6 (six) hours as needed for Wheezing. Rescue    AZELASTINE (ASTELIN) 137 MCG (0.1 %) NASAL SPRAY    1 spray (137 mcg total) by Nasal route 2 (two) times daily.    AZITHROMYCIN (Z-JIMBO) 250 MG TABLET    Take 2 tablets by mouth on day 1; Take 1 tablet by mouth on days 2-5    BENZONATATE (TESSALON) 200 MG CAPSULE    Take 1 capsule (200 mg total) by mouth 3 (three) times daily as needed for Cough.    FLUTICASONE PROPIONATE (FLONASE) 50 MCG/ACTUATION NASAL SPRAY    1 spray (50 mcg total) by Each Nostril route once daily.    GUAIFENESIN (MUCINEX) 600 MG 12 HR TABLET    Take 2 tablets (1,200 mg total) by mouth 2 (two) times daily. for 10 days    PROMETHAZINE-DEXTROMETHORPHAN (PROMETHAZINE-DM) 6.25-15 MG/5 ML SYRP    Take 5 mLs by mouth every 4 (four) hours as needed (cough).  "  Current Medications    DULOXETINE (CYMBALTA) 20 MG CAPSULE    Take 1 capsule (20 mg total) by mouth once daily.    ERGOCALCIFEROL (ERGOCALCIFEROL) 50,000 UNIT CAP    Take 1 capsule (50,000 Units total) by mouth every 7 days.    SEMAGLUTIDE, WEIGHT LOSS, (WEGOVY) 0.25 MG/0.5 ML PNIJ    Inject 0.25 mg into the skin every 7 days.    UBROGEPANT (UBRELVY) 100 MG TABLET    Take 1 tablet (100 mg total) by mouth as needed for Migraine. If symptoms persist or return, may repeat dose after 2 hours. Maximum: 200 mg per 24 hours       SCREENING HISTORY:    274}  Health Maintenance         Date Due Completion Date    TETANUS VACCINE Never done ---    Cervical Cancer Screening 08/16/2024 8/16/2021    COVID-19 Vaccine (1 - 2024-25 season) Never done ---    Influenza Vaccine (Season Ended) 09/01/2025 ---    RSV Vaccine (Age 60+ and Pregnant patients) (1 - 1-dose 75+ series) 03/11/2069 ---            REVIEW OF SYSTEMS:   ROS    PHYSICAL EXAM:      274}  /72 (BP Location: Left arm, Patient Position: Sitting)   Pulse 95   Resp 15   Ht 5' 0.98" (1.549 m)   Wt 82.6 kg (182 lb)   LMP 04/06/2025 (Approximate)   SpO2 100%   BMI 34.41 kg/m²   Wt Readings from Last 3 Encounters:   05/06/25 82.6 kg (182 lb)   04/23/25 83.5 kg (184 lb)   04/20/25 83.5 kg (184 lb)     BP Readings from Last 3 Encounters:   05/06/25 128/72   04/23/25 136/86   04/21/25 120/65     Estimated body mass index is 34.41 kg/m² as calculated from the following:    Height as of this encounter: 5' 0.98" (1.549 m).    Weight as of this encounter: 82.6 kg (182 lb).     Physical Exam  HENT:      Head: Normocephalic and atraumatic.      Nose: Nose normal.      Mouth/Throat:      Mouth: Mucous membranes are dry.      Pharynx: Oropharynx is clear.   Eyes:      Extraocular Movements: Extraocular movements intact.      Conjunctiva/sclera: Conjunctivae normal.   Cardiovascular:      Rate and Rhythm: Normal rate and regular rhythm.   Pulmonary:      Effort: Pulmonary " effort is normal.      Breath sounds: Normal breath sounds.   Abdominal:      General: Bowel sounds are normal.      Palpations: Abdomen is soft.   Musculoskeletal:         General: Normal range of motion.      Cervical back: Normal range of motion.   Skin:     General: Skin is warm.   Neurological:      General: No focal deficit present.      Mental Status: She is alert. Mental status is at baseline.   Psychiatric:         Mood and Affect: Mood normal.         Thought Content: Thought content normal.         LABS:   274}  I have reviewed old labs below:  Lab Results   Component Value Date    WBC 4.99 04/19/2025    HGB 14.0 04/19/2025    HCT 39.8 04/19/2025    MCV 83 04/19/2025     04/19/2025     04/19/2025    K 4.2 04/19/2025     04/19/2025    CALCIUM 9.2 04/19/2025    PHOS 3.2 04/19/2025    CO2 23 04/19/2025    GLU 85 04/19/2025    BUN 13 04/19/2025    CREATININE 0.7 04/19/2025    ANIONGAP 9 04/19/2025    ESTGFRAFRICA >60.0 08/23/2020    EGFRNONAA >60.0 08/23/2020    PROT 7.3 04/19/2025    ALBUMIN 3.9 04/19/2025    BILITOT 0.4 04/19/2025    ALKPHOS 57 04/19/2025    ALT 17 04/19/2025    AST 17 04/19/2025    INR 1.1 02/05/2025    CHOL 166 04/19/2025    TRIG 124 04/19/2025    HDL 29 (L) 04/19/2025    LDLCALC 112.2 04/19/2025    TSH 1.484 04/19/2025    HGBA1C 5.0 04/19/2025       ASSESSMENT AND PLAN:  274}  Assessment & Plan    IMPRESSION:  Considered recurrent pneumonia episodes, with last occurrence 2 months ago.  Ruled out cardiac issues based on previous stress test results.  Suspecting potential lung pathology given history and symptoms.  Considered immune deficiency as possible cause for recurrent infections in younger patients.  Noted low iron saturation and ferritin levels, indicating insufficient iron status in the body.    PNEUMONIA:  - Ms. Narayan reports feeling like pneumonia is coming on with symptoms of coughing, sneezing, rhinorrhea, fatigue, and dyspnea.  - Ordered chest XR to  check for pneumonia and prescribed antibiotics to prevent potential infection.  - Instructed patient to contact the office if pneumonia symptoms worsen or if there are any concerns before the pulmonology appointment.    SHORTNESS OF BREATH (DYSPNEA):  - Ms. Narayan reports dyspnea, especially when bending over or talking rapidly, with history of dyspnea already present.  - Evaluated stress test results which were normal, showing no cardiac abnormalities or blockages.  - Assessed potential pulmonary pathology as cause of dyspnea.  - Prescribed inhaler for dyspnea (if the patient chooses to use it).  - Ordered pulmonary function test (PFT) to assess lung capacity and identify potential pulmonary issues such as COPD, asthma, or oxygen transfer problems.    COUGH:  - Ms. Narayan reports coughing as one of the symptoms along with sneezing and rhinorrhea.  - Prescribed cough medications and antibiotics to prevent pneumonia.  - Recommend OTC Robitussin for cough, if needed.    CIRCULATORY AND RESPIRATORY SYMPTOMS:  - Ms. Narayan reports dizziness, lightheadedness, and chest pressure, especially when exerting or changing positions.  - Stress test results were normal, indicating no cardiac abnormalities or blockages.  - Assessed potential pulmonary pathology as cause of symptoms.  - Ordered pulmonary function test (PFT) to assess lung capacity and potential causes of symptoms.  - Ms. Narayan to add more salt to diet to help with low BP symptoms.    IRON DEFICIENCY:  - Monitored patient's iron saturation which is low, with ferritin at 25 (low end of normal range), indicating insufficient iron in the body.  - Recommend monitoring iron saturation and ferritin levels.  - Recommend vitamin D supplement due to low iron levels.    HISTORY OF RECURRENT PNEUMONIA:  - Ms. Narayan has history of recurrent pneumonia, with episodes occurring 2 months ago and 4 months ago.  - Previous pneumonia episode in February was associated with  sepsis, and CT in October confirmed pneumonia after initial negative findings.  - Discussed potential immune deficiency as a cause of recurring pneumonia.    HISTORY OF SEPSIS:  - Ms. Narayan reports history of sepsis in February associated with pneumonia episode.    FOLLOW-UP:  - Referred patient to pulmonologist (Dr.Dennis Maravilla or partners) for evaluation of recurrent pneumonia and persistent respiratory symptoms.  - Advised follow up with pulmonologist as soon as possible.  - Consider rescheduling sleep study when family circumstances allow.        1. Shortness of breath  - promethazine-dextromethorphan (PROMETHAZINE-DM) 6.25-15 mg/5 mL Syrp; Take 5 mLs by mouth every 4 (four) hours as needed (cough).  Dispense: 118 mL; Refill: 0  - Complete PFT without bronchodilator; Future  - X-Ray Chest PA And Lateral; Future  - azithromycin (Z-JIMBO) 250 MG tablet; Take 2 tablets by mouth on day 1; Take 1 tablet by mouth on days 2-5  Dispense: 6 tablet; Refill: 0  - benzonatate (TESSALON) 200 MG capsule; Take 1 capsule (200 mg total) by mouth 3 (three) times daily as needed for Cough.  Dispense: 30 capsule; Refill: 0  - azelastine (ASTELIN) 137 mcg (0.1 %) nasal spray; 1 spray (137 mcg total) by Nasal route 2 (two) times daily.  Dispense: 18.2 mL; Refill: 0  - fluticasone propionate (FLONASE) 50 mcg/actuation nasal spray; 1 spray (50 mcg total) by Each Nostril route once daily.  Dispense: 18.2 mL; Refill: 0  - guaiFENesin (MUCINEX) 600 mg 12 hr tablet; Take 2 tablets (1,200 mg total) by mouth 2 (two) times daily. for 10 days  Dispense: 30 tablet; Refill: 0  - albuterol (VENTOLIN HFA) 90 mcg/actuation inhaler; Inhale 2 puffs into the lungs every 6 (six) hours as needed for Wheezing. Rescue  Dispense: 18 g; Refill: 0  - Ambulatory referral/consult to Pulmonology; Future         Orders Placed This Encounter   Procedures    X-Ray Chest PA And Lateral    Ambulatory referral/consult to Pulmonology    Complete PFT without  bronchodilator       Follow up in about 3 months (around 8/6/2025). or sooner as needed.                 [1]   Social History  Socioeconomic History    Marital status:    Tobacco Use    Smoking status: Never    Smokeless tobacco: Never   Substance and Sexual Activity    Alcohol use: No    Drug use: No    Sexual activity: Yes     Partners: Male     Birth control/protection: None     Social Drivers of Health     Financial Resource Strain: Patient Declined (4/16/2025)    Overall Financial Resource Strain (CARDIA)     Difficulty of Paying Living Expenses: Patient declined   Food Insecurity: Patient Declined (4/16/2025)    Hunger Vital Sign     Worried About Running Out of Food in the Last Year: Patient declined     Ran Out of Food in the Last Year: Patient declined   Transportation Needs: Patient Declined (4/16/2025)    PRAPARE - Transportation     Lack of Transportation (Medical): Patient declined     Lack of Transportation (Non-Medical): Patient declined   Physical Activity: Inactive (4/16/2025)    Exercise Vital Sign     Days of Exercise per Week: 0 days     Minutes of Exercise per Session: 0 min   Stress: No Stress Concern Present (4/16/2025)    [employer] - Occupational Stress Questionnaire     Feeling of Stress : Only a little   Housing Stability: Patient Declined (4/16/2025)    Housing Stability Vital Sign     Unable to Pay for Housing in the Last Year: Patient declined     Number of Times Moved in the Last Year: 0     Homeless in the Last Year: Patient declined

## 2025-05-19 ENCOUNTER — PROCEDURE VISIT (OUTPATIENT)
Dept: RESPIRATORY THERAPY | Facility: HOSPITAL | Age: 30
End: 2025-05-19
Attending: STUDENT IN AN ORGANIZED HEALTH CARE EDUCATION/TRAINING PROGRAM
Payer: COMMERCIAL

## 2025-05-19 DIAGNOSIS — R06.02 SHORTNESS OF BREATH: ICD-10-CM

## 2025-05-19 PROCEDURE — 94727 GAS DIL/WSHOT DETER LNG VOL: CPT

## 2025-05-19 PROCEDURE — 94010 BREATHING CAPACITY TEST: CPT

## 2025-05-19 PROCEDURE — 94729 DIFFUSING CAPACITY: CPT

## 2025-05-20 ENCOUNTER — PATIENT MESSAGE (OUTPATIENT)
Dept: FAMILY MEDICINE | Facility: CLINIC | Age: 30
End: 2025-05-20
Payer: COMMERCIAL

## 2025-05-20 DIAGNOSIS — R94.2 ABNORMAL PFT: Primary | ICD-10-CM

## 2025-05-20 LAB
BRPFT: NORMAL
DLCO ADJ PRE: 22.39 ML/(MIN*MMHG)
DLCO SINGLE BREATH LLN: 19.41
DLCO SINGLE BREATH PRE REF: 89.1 %
DLCO SINGLE BREATH REF: 25.14
DLCOC SBVA LLN: 3.86
DLCOC SBVA PRE REF: 103.9 %
DLCOC SBVA REF: 5.66
DLCOC SINGLE BREATH LLN: 19.41
DLCOC SINGLE BREATH PRE REF: 89.1 %
DLCOC SINGLE BREATH REF: 25.14
DLCOVA LLN: 3.86
DLCOVA PRE REF: 103.9 %
DLCOVA PRE: 5.89 ML/(MIN*MMHG*L)
DLCOVA REF: 5.66
DLVAADJ PRE: 5.89 ML/(MIN*MMHG*L)
ERVN2 LLN: -16448.8
ERVN2 PRE REF: 76.2 %
ERVN2 PRE: 0.92 L
ERVN2 REF: 1.2
FEF 25 75 LLN: 2.41
FEF 25 75 PRE REF: 53.8 %
FEF 25 75 REF: 3.8
FEV1 FVC LLN: 74
FEV1 FVC PRE REF: 86.3 %
FEV1 FVC REF: 85
FEV1 LLN: 2.32
FEV1 PRE REF: 78.5 %
FEV1 REF: 2.89
FRCN2 LLN: 1.68
FRCN2 PRE REF: 71.1 %
FRCN2 REF: 2.5
FVC LLN: 2.72
FVC PRE REF: 90.7 %
FVC REF: 3.4
IVC PRE: 2.83 L
IVC SINGLE BREATH LLN: 2.72
IVC SINGLE BREATH PRE REF: 83.2 %
IVC SINGLE BREATH REF: 3.4
MVV LLN: 93
MVV PRE REF: 37.3 %
MVV REF: 110
PEF LLN: 5
PEF PRE REF: 42.3 %
PEF REF: 6.56
PRE DLCO: 22.39 ML/(MIN*MMHG)
PRE FEF 25 75: 2.04 L/S
PRE FET 100: 9.91 SEC
PRE FEV1 FVC: 73.44 %
PRE FEV1: 2.27 L
PRE FRC N2: 1.78 L
PRE FVC: 3.09 L
PRE MVV: 41 L/MIN
PRE PEF: 2.77 L/S
RVN2 LLN: 0.73
RVN2 PRE REF: 35.7 %
RVN2 PRE: 0.46 L
RVN2 REF: 1.3
RVN2TLCN2 LLN: 19.91
RVN2TLCN2 PRE REF: 44.3 %
RVN2TLCN2 PRE: 13.07 %
RVN2TLCN2 REF: 29.5
TLCN2 LLN: 3.45
TLCN2 PRE REF: 80 %
TLCN2 PRE: 3.55 L
TLCN2 REF: 4.44
VA PRE: 3.81 L
VA SINGLE BREATH LLN: 4.29
VA SINGLE BREATH PRE REF: 88.7 %
VA SINGLE BREATH REF: 4.29
VCMAXN2 LLN: 2.72
VCMAXN2 PRE REF: 90.7 %
VCMAXN2 PRE: 3.09 L
VCMAXN2 REF: 3.4

## 2025-05-23 ENCOUNTER — PATIENT MESSAGE (OUTPATIENT)
Dept: FAMILY MEDICINE | Facility: CLINIC | Age: 30
End: 2025-05-23
Payer: COMMERCIAL

## 2025-05-23 ENCOUNTER — HOSPITAL ENCOUNTER (EMERGENCY)
Facility: HOSPITAL | Age: 30
Discharge: HOME | End: 2025-05-23
Attending: EMERGENCY MEDICINE
Payer: COMMERCIAL

## 2025-05-23 VITALS
HEART RATE: 100 BPM | BODY MASS INDEX: 33.99 KG/M2 | WEIGHT: 180 LBS | RESPIRATION RATE: 19 BRPM | OXYGEN SATURATION: 100 % | HEIGHT: 61 IN | SYSTOLIC BLOOD PRESSURE: 136 MMHG | TEMPERATURE: 99 F | DIASTOLIC BLOOD PRESSURE: 74 MMHG

## 2025-05-23 DIAGNOSIS — R07.81 CHEST PAIN, PLEURITIC: Primary | ICD-10-CM

## 2025-05-23 DIAGNOSIS — R07.9 CHEST PAIN: ICD-10-CM

## 2025-05-23 PROCEDURE — 71046 X-RAY EXAM CHEST 2 VIEWS: CPT | Mod: 26,,, | Performed by: RADIOLOGY

## 2025-05-23 PROCEDURE — 99283 EMERGENCY DEPT VISIT LOW MDM: CPT | Mod: 25

## 2025-05-23 PROCEDURE — 71046 X-RAY EXAM CHEST 2 VIEWS: CPT | Mod: TC

## 2025-05-23 RX ORDER — PREDNISONE 20 MG/1
20 TABLET ORAL DAILY
Qty: 5 TABLET | Refills: 0 | Status: SHIPPED | OUTPATIENT
Start: 2025-05-23 | End: 2025-05-28

## 2025-05-27 LAB
OHS QRS DURATION: 76 MS
OHS QTC CALCULATION: 437 MS

## 2025-06-11 ENCOUNTER — PATIENT MESSAGE (OUTPATIENT)
Dept: FAMILY MEDICINE | Facility: CLINIC | Age: 30
End: 2025-06-11
Payer: COMMERCIAL

## 2025-07-24 ENCOUNTER — TELEPHONE (OUTPATIENT)
Dept: FAMILY MEDICINE | Facility: CLINIC | Age: 30
End: 2025-07-24
Payer: COMMERCIAL

## 2025-08-13 ENCOUNTER — PATIENT MESSAGE (OUTPATIENT)
Dept: ADMINISTRATIVE | Facility: HOSPITAL | Age: 30
End: 2025-08-13
Payer: COMMERCIAL

## 2025-08-25 ENCOUNTER — PATIENT MESSAGE (OUTPATIENT)
Dept: FAMILY MEDICINE | Facility: CLINIC | Age: 30
End: 2025-08-25
Payer: COMMERCIAL

## 2025-09-03 ENCOUNTER — E-VISIT (OUTPATIENT)
Dept: FAMILY MEDICINE | Facility: CLINIC | Age: 30
End: 2025-09-03
Payer: COMMERCIAL

## 2025-09-03 DIAGNOSIS — H10.9 CONJUNCTIVITIS, UNSPECIFIED CONJUNCTIVITIS TYPE, UNSPECIFIED LATERALITY: Primary | ICD-10-CM

## 2025-09-03 RX ORDER — NEOMYCIN SULFATE, POLYMYXIN B SULFATE AND DEXAMETHASONE 3.5; 10000; 1 MG/ML; [USP'U]/ML; MG/ML
1 SUSPENSION/ DROPS OPHTHALMIC
Qty: 5 ML | Refills: 0 | Status: SHIPPED | OUTPATIENT
Start: 2025-09-03

## 2025-09-03 RX ORDER — LEVOCETIRIZINE DIHYDROCHLORIDE 5 MG/1
5 TABLET, FILM COATED ORAL NIGHTLY
Qty: 30 TABLET | Refills: 11 | Status: SHIPPED | OUTPATIENT
Start: 2025-09-03 | End: 2026-09-03